# Patient Record
Sex: MALE | Race: WHITE | NOT HISPANIC OR LATINO | URBAN - METROPOLITAN AREA
[De-identification: names, ages, dates, MRNs, and addresses within clinical notes are randomized per-mention and may not be internally consistent; named-entity substitution may affect disease eponyms.]

---

## 2017-06-28 ENCOUNTER — APPOINTMENT (RX ONLY)
Dept: URBAN - METROPOLITAN AREA CLINIC 24 | Facility: CLINIC | Age: 77
Setting detail: DERMATOLOGY
End: 2017-06-28

## 2017-06-28 DIAGNOSIS — L82.1 OTHER SEBORRHEIC KERATOSIS: ICD-10-CM

## 2017-06-28 DIAGNOSIS — Z85.828 PERSONAL HISTORY OF OTHER MALIGNANT NEOPLASM OF SKIN: ICD-10-CM

## 2017-06-28 DIAGNOSIS — D485 NEOPLASM OF UNCERTAIN BEHAVIOR OF SKIN: ICD-10-CM

## 2017-06-28 PROBLEM — J30.1 ALLERGIC RHINITIS DUE TO POLLEN: Status: ACTIVE | Noted: 2017-06-28

## 2017-06-28 PROBLEM — D48.5 NEOPLASM OF UNCERTAIN BEHAVIOR OF SKIN: Status: ACTIVE | Noted: 2017-06-28

## 2017-06-28 PROCEDURE — ? SHAVE REMOVAL

## 2017-06-28 ASSESSMENT — LOCATION DETAILED DESCRIPTION DERM
LOCATION DETAILED: LEFT POSTERIOR SHOULDER
LOCATION DETAILED: LEFT INFERIOR LATERAL NECK
LOCATION DETAILED: RIGHT INFERIOR POSTERIOR HELIX
LOCATION DETAILED: RIGHT AREOLA

## 2017-06-28 ASSESSMENT — LOCATION ZONE DERM
LOCATION ZONE: EAR
LOCATION ZONE: NECK
LOCATION ZONE: TRUNK
LOCATION ZONE: ARM

## 2017-06-28 ASSESSMENT — LOCATION SIMPLE DESCRIPTION DERM
LOCATION SIMPLE: LEFT ANTERIOR NECK
LOCATION SIMPLE: RIGHT CHEST
LOCATION SIMPLE: RIGHT EAR
LOCATION SIMPLE: LEFT SHOULDER

## 2017-06-28 NOTE — PROCEDURE: SHAVE REMOVAL
Bill [id number] For Specimen Handling/Conveyance To Laboratory?: no
Biopsy Method: Dermablade
Billing Type: Third-Party Bill
Anesthesia Volume In Cc: 1
Path Notes (To The Dermatopathologist): Please check margins.
Notification Instructions: Patient will be notified of biopsy results. However, patient instructed to call the office if not contacted within 2 weeks.
Wound Care: Petrolatum
Consent was obtained from the patient. The risks and benefits to therapy were discussed in detail. Specifically, the risks of infection, scarring, bleeding, prolonged wound healing, incomplete removal, allergy to anesthesia, nerve injury and recurrence were addressed. Prior to the procedure, the treatment site was clearly identified and confirmed by the patient.
X Size Of Lesion In Cm (Optional): 0
Anesthesia Type: 1% lidocaine with epinephrine
Detail Level: Detailed
Hemostasis: Aluminum Chloride
Medical Necessity Information: It is in your best interest to select a reason for this procedure from the list below. All of these items fulfill various CMS LCD requirements except the new and changing color options.
Post-Care Instructions: I reviewed with the patient in detail post-care instructions. Patient is to keep the biopsy site dry overnight, and then apply bacitracin twice daily until healed. Patient may apply hydrogen peroxide soaks to remove any crusting.
Medical Necessity Clause: This procedure was medically necessary because the lesion that was treated was:

## 2017-08-09 ENCOUNTER — HOSPITAL ENCOUNTER (OUTPATIENT)
Dept: MRI IMAGING | Age: 77
Discharge: HOME | End: 2017-08-09
Attending: INTERNAL MEDICINE
Payer: MEDICARE

## 2017-08-09 DIAGNOSIS — R51.9 HEAD ACHE: ICD-10-CM

## 2017-08-09 PROCEDURE — 70551 MRI BRAIN STEM W/O DYE: CPT

## 2017-08-16 ENCOUNTER — HOSPITAL ENCOUNTER (OUTPATIENT)
Dept: LAB | Age: 77
Discharge: HOME | End: 2017-08-16
Attending: INTERNAL MEDICINE
Payer: MEDICARE

## 2017-08-16 LAB
ALBUMIN SERPL-MCNC: 4.1 G/DL (ref 3.2–4.6)
ALBUMIN/GLOB SERPL: 1.3 {RATIO} (ref 1.2–3.5)
ALP SERPL-CCNC: 124 U/L (ref 50–136)
ALT SERPL-CCNC: 22 U/L (ref 12–65)
ANION GAP SERPL CALC-SCNC: 8 MMOL/L (ref 7–16)
AST SERPL-CCNC: 19 U/L (ref 15–37)
BILIRUB DIRECT SERPL-MCNC: 0.1 MG/DL
BILIRUB SERPL-MCNC: 0.9 MG/DL (ref 0.2–1.1)
BUN SERPL-MCNC: 14 MG/DL (ref 8–23)
CALCIUM SERPL-MCNC: 9 MG/DL (ref 8.3–10.4)
CHLORIDE SERPL-SCNC: 104 MMOL/L (ref 98–107)
CHOLEST SERPL-MCNC: 145 MG/DL
CO2 SERPL-SCNC: 28 MMOL/L (ref 21–32)
CREAT SERPL-MCNC: 1.1 MG/DL (ref 0.8–1.5)
GLOBULIN SER CALC-MCNC: 3.2 G/DL (ref 2.3–3.5)
GLUCOSE SERPL-MCNC: 113 MG/DL (ref 65–100)
HDLC SERPL-MCNC: 41 MG/DL (ref 40–60)
HDLC SERPL: 3.5 {RATIO}
LDLC SERPL CALC-MCNC: 90.6 MG/DL
LIPID PROFILE,FLP: NORMAL
POTASSIUM SERPL-SCNC: 4 MMOL/L (ref 3.5–5.1)
PROT SERPL-MCNC: 7.3 G/DL (ref 6.3–8.2)
SODIUM SERPL-SCNC: 140 MMOL/L (ref 136–145)
TRIGL SERPL-MCNC: 67 MG/DL (ref 35–150)
VIT B12 SERPL-MCNC: 228 PG/ML (ref 193–986)
VLDLC SERPL CALC-MCNC: 13.4 MG/DL (ref 6–23)

## 2017-08-16 PROCEDURE — 80048 BASIC METABOLIC PNL TOTAL CA: CPT | Performed by: INTERNAL MEDICINE

## 2017-08-16 PROCEDURE — 80061 LIPID PANEL: CPT | Performed by: INTERNAL MEDICINE

## 2017-08-16 PROCEDURE — 36415 COLL VENOUS BLD VENIPUNCTURE: CPT | Performed by: INTERNAL MEDICINE

## 2017-08-16 PROCEDURE — 80076 HEPATIC FUNCTION PANEL: CPT | Performed by: INTERNAL MEDICINE

## 2017-08-16 PROCEDURE — 82607 VITAMIN B-12: CPT | Performed by: INTERNAL MEDICINE

## 2017-12-14 PROBLEM — N40.0 BENIGN PROSTATIC HYPERPLASIA WITHOUT LOWER URINARY TRACT SYMPTOMS: Status: ACTIVE | Noted: 2017-12-14

## 2018-01-05 PROBLEM — E53.8 B12 DEFICIENCY: Status: ACTIVE | Noted: 2018-01-05

## 2018-01-15 PROBLEM — G62.9 AXONAL POLYNEUROPATHY: Status: ACTIVE | Noted: 2018-01-15

## 2018-08-08 ENCOUNTER — HOSPITAL ENCOUNTER (OUTPATIENT)
Dept: CARDIAC REHAB | Age: 78
Discharge: HOME | End: 2018-08-08
Attending: INTERNAL MEDICINE
Payer: MEDICARE

## 2018-08-08 PROCEDURE — 99407 BEHAV CHNG SMOKING > 10 MIN: CPT

## 2018-08-15 ENCOUNTER — HOSPITAL ENCOUNTER (OUTPATIENT)
Dept: CARDIAC REHAB | Age: 78
Discharge: HOME | End: 2018-08-15
Attending: INTERNAL MEDICINE
Payer: MEDICARE

## 2018-08-15 PROCEDURE — 99407 BEHAV CHNG SMOKING > 10 MIN: CPT

## 2018-08-22 ENCOUNTER — HOSPITAL ENCOUNTER (OUTPATIENT)
Dept: CARDIAC REHAB | Age: 78
Discharge: HOME | End: 2018-08-22
Attending: INTERNAL MEDICINE
Payer: MEDICARE

## 2018-08-22 ENCOUNTER — APPOINTMENT (OUTPATIENT)
Dept: CARDIAC REHAB | Age: 78
End: 2018-08-22
Attending: INTERNAL MEDICINE
Payer: MEDICARE

## 2018-08-22 PROCEDURE — 99407 BEHAV CHNG SMOKING > 10 MIN: CPT

## 2018-08-29 ENCOUNTER — APPOINTMENT (OUTPATIENT)
Dept: CARDIAC REHAB | Age: 78
End: 2018-08-29
Attending: INTERNAL MEDICINE
Payer: MEDICARE

## 2018-08-29 ENCOUNTER — HOSPITAL ENCOUNTER (OUTPATIENT)
Dept: CARDIAC REHAB | Age: 78
Discharge: HOME | End: 2018-08-29
Attending: INTERNAL MEDICINE
Payer: MEDICARE

## 2018-08-29 PROCEDURE — 99407 BEHAV CHNG SMOKING > 10 MIN: CPT

## 2019-08-10 ENCOUNTER — HOSPITAL ENCOUNTER (EMERGENCY)
Age: 79
Discharge: HOME | End: 2019-08-10
Attending: EMERGENCY MEDICINE
Payer: MEDICARE

## 2019-08-10 VITALS
TEMPERATURE: 98.1 F | WEIGHT: 205 LBS | BODY MASS INDEX: 27.77 KG/M2 | HEIGHT: 72 IN | RESPIRATION RATE: 16 BRPM | HEART RATE: 97 BPM | SYSTOLIC BLOOD PRESSURE: 128 MMHG | OXYGEN SATURATION: 97 % | DIASTOLIC BLOOD PRESSURE: 84 MMHG

## 2019-08-10 DIAGNOSIS — R33.9 URINARY RETENTION: Primary | ICD-10-CM

## 2019-08-10 LAB
BACTERIA URNS QL MICRO: 0 /HPF
CASTS URNS QL MICRO: 0 /LPF
CRYSTALS URNS QL MICRO: 0 /LPF
EPI CELLS #/AREA URNS HPF: 0 /HPF
MUCOUS THREADS URNS QL MICRO: ABNORMAL /LPF
OTHER OBSERVATIONS,UCOM: ABNORMAL
RBC #/AREA URNS HPF: ABNORMAL /HPF
WBC URNS QL MICRO: 0 /HPF

## 2019-08-10 PROCEDURE — 77030005553 HC CATH URETH FOL52 BARD -A: Performed by: EMERGENCY MEDICINE

## 2019-08-10 PROCEDURE — 81015 MICROSCOPIC EXAM OF URINE: CPT

## 2019-08-10 PROCEDURE — 99283 EMERGENCY DEPT VISIT LOW MDM: CPT | Performed by: EMERGENCY MEDICINE

## 2019-08-10 PROCEDURE — 81003 URINALYSIS AUTO W/O SCOPE: CPT | Performed by: EMERGENCY MEDICINE

## 2019-08-10 PROCEDURE — 77030005518 HC CATH URETH FOL 2W BARD -B: Performed by: EMERGENCY MEDICINE

## 2019-08-10 PROCEDURE — 51702 INSERT TEMP BLADDER CATH: CPT | Performed by: EMERGENCY MEDICINE

## 2019-08-10 NOTE — DISCHARGE INSTRUCTIONS
Follow-up with Dr. Mary Lou Gonzalez, call his office to make an appointment. Return to the emergency department if your symptoms recur despite the Lloyd catheter.

## 2019-08-10 NOTE — ED NOTES
Placed chun with only 25mL output. Bladder scanner before catheter reveals max 136mL residual in bladder post urination.

## 2019-08-10 NOTE — ED NOTES
Pt states his urine is draining around his catheter.  MD notified and arrives to bedside for evaluation

## 2019-08-10 NOTE — ED NOTES
New catheter placed per MD verbal order. Pt tolerated well. Some leaking still noted with new catheter/ larger bore. MD notified. D/C orders to continue.

## 2019-08-10 NOTE — ED NOTES
I have reviewed discharge instructions with the patient and spouse. The patient and spouse verbalized understanding. Patient left ED via Discharge Method: ambulatory to Home with self. Opportunity for questions and clarification provided. Patient given 0 scripts. To continue your aftercare when you leave the hospital, you may receive an automated call from our care team to check in on how you are doing. This is a free service and part of our promise to provide the best care and service to meet your aftercare needs.  If you have questions, or wish to unsubscribe from this service please call [phone]. Thank you for Choosing our [facility] Emergency Department.

## 2019-08-10 NOTE — ED PROVIDER NOTES
Patient states he has been having urinary retention for the past week or so. He has been able to urinate but only a small amount. He feels like he has to go but is unable to. He denies similar symptoms in the past, states his symptoms are getting worse. He denies any fever, denies any vomiting or diarrhea. Elements of this note were made using speech recognition software. As such, errors of speech recognition may occur.            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    BPH (benign prostatic hypertrophy) 12/4/2013    Colon polyps     Elevated prostate specific antigen (PSA)     Hiatal hernia     HTN (hypertension) 12/4/2013    Impotence of organic origin     Ocular migraine     x 1    Sleep apnea     borderline    Vertigo 12/4/2013       Past Surgical History:   Procedure Laterality Date    HX CHOLECYSTECTOMY      hernia x2    HX HERNIA REPAIR      left inguinal    HX HIP REPLACEMENT  2007    HX LAP CHOLECYSTECTOMY  2007    HX ORTHOPAEDIC      hip     HX TONSILLECTOMY  childhood         Family History:   Problem Relation Age of Onset    Suicide Mother 61       Social History     Socioeconomic History    Marital status:      Spouse name: Not on file    Number of children: Not on file    Years of education: Not on file    Highest education level: Not on file   Occupational History    Not on file   Social Needs    Financial resource strain: Not on file    Food insecurity:     Worry: Not on file     Inability: Not on file    Transportation needs:     Medical: Not on file     Non-medical: Not on file   Tobacco Use    Smoking status: Never Smoker    Smokeless tobacco: Current User    Tobacco comment: 2 bites of 1 cigar in last week   Substance and Sexual Activity    Alcohol use: Yes     Comment: rare occassion    Drug use: No    Sexual activity: Not on file   Lifestyle    Physical activity:     Days per week: Not on file     Minutes per session: Not on file    Stress: Not on file   Relationships    Social connections:     Talks on phone: Not on file     Gets together: Not on file     Attends [employer]ism service: Not on file     Active member of club or organization: Not on file     Attends meetings of clubs or organizations: Not on file     Relationship status: Not on file    Intimate partner violence:     Fear of current or ex partner: Not on file     Emotionally abused: Not on file     Physically abused: Not on file     Forced sexual activity: Not on file   Other Topics Concern    Not on file   Social History Narrative    Not on file         ALLERGIES: Multihance [gadobenate dimeglumine]    Review of Systems   Constitutional: Negative for chills and fever. Gastrointestinal: Negative for nausea and vomiting. All other systems reviewed and are negative. Vitals:    08/10/19 1358   BP: 126/87   Pulse: (!) 101   Resp: 18   Temp: 97.9 °F (36.6 °C)   SpO2: 96%   Weight: 93 kg (205 lb)   Height: 6' (1.829 m)            Physical Exam   Constitutional: He is oriented to person, place, and time. He appears well-developed and well-nourished. HENT:   Head: Normocephalic and atraumatic. Eyes: Pupils are equal, round, and reactive to light. Conjunctivae are normal.   Neck: Normal range of motion. Neck supple. Pulmonary/Chest: Effort normal. No respiratory distress. Musculoskeletal: Normal range of motion. He exhibits no edema. Neurological: He is alert and oriented to person, place, and time. Skin: Skin is warm and dry. Nursing note and vitals reviewed. MDM  Number of Diagnoses or Management Options  Urinary retention: new and requires workup  Diagnosis management comments: 2:44 PM Lloyd will be placed  4:51 PM 16Fr Lloyd placed, patient is still having a moderate amount of leakage.   We will replace with an 18Fr Lloyd    Risk of Complications, Morbidity, and/or Mortality  Presenting problems: moderate  Diagnostic procedures: moderate  Management options: moderate    Patient Progress  Patient progress: improved         Procedures

## 2019-08-10 NOTE — ED NOTES
Pt states the chun catheter is uncomfortable when he sits down. Chun is draining and proper placement verified.

## 2019-08-10 NOTE — ED TRIAGE NOTES
Reports unable to empty his bladder. States has been dealing with retention problems for quite some time. Called urologist and was told he needs to come for a catheter.

## 2019-09-25 ENCOUNTER — HOSPITAL ENCOUNTER (OUTPATIENT)
Dept: SURGERY | Age: 79
Discharge: HOME | End: 2019-09-25
Payer: MEDICARE

## 2019-09-25 VITALS
SYSTOLIC BLOOD PRESSURE: 146 MMHG | HEIGHT: 72 IN | OXYGEN SATURATION: 96 % | BODY MASS INDEX: 27.7 KG/M2 | DIASTOLIC BLOOD PRESSURE: 84 MMHG | TEMPERATURE: 97.7 F | HEART RATE: 76 BPM | WEIGHT: 204.5 LBS | RESPIRATION RATE: 16 BRPM

## 2019-09-25 LAB
ANION GAP SERPL CALC-SCNC: 5 MMOL/L (ref 7–16)
APPEARANCE UR: ABNORMAL
BACTERIA URNS QL MICRO: ABNORMAL /HPF
BILIRUB UR QL: NEGATIVE
BUN SERPL-MCNC: 10 MG/DL (ref 8–23)
CALCIUM SERPL-MCNC: 9.1 MG/DL (ref 8.3–10.4)
CASTS URNS QL MICRO: ABNORMAL /LPF
CHLORIDE SERPL-SCNC: 110 MMOL/L (ref 98–107)
CO2 SERPL-SCNC: 27 MMOL/L (ref 21–32)
COLOR UR: YELLOW
CREAT SERPL-MCNC: 0.97 MG/DL (ref 0.8–1.5)
EPI CELLS #/AREA URNS HPF: 0 /HPF
ERYTHROCYTE [DISTWIDTH] IN BLOOD BY AUTOMATED COUNT: 12.2 % (ref 11.9–14.6)
GLUCOSE SERPL-MCNC: 125 MG/DL (ref 65–100)
GLUCOSE UR STRIP.AUTO-MCNC: NEGATIVE MG/DL
HCT VFR BLD AUTO: 43.4 % (ref 41.1–50.3)
HGB BLD-MCNC: 14.2 G/DL (ref 13.6–17.2)
HGB UR QL STRIP: NEGATIVE
KETONES UR QL STRIP.AUTO: NEGATIVE MG/DL
LEUKOCYTE ESTERASE UR QL STRIP.AUTO: ABNORMAL
MCH RBC QN AUTO: 31.4 PG (ref 26.1–32.9)
MCHC RBC AUTO-ENTMCNC: 32.7 G/DL (ref 31.4–35)
MCV RBC AUTO: 96 FL (ref 79.6–97.8)
NITRITE UR QL STRIP.AUTO: NEGATIVE
NRBC # BLD: 0 K/UL (ref 0–0.2)
PH UR STRIP: 6 [PH] (ref 5–9)
PLATELET # BLD AUTO: 221 K/UL (ref 150–450)
PMV BLD AUTO: 9.7 FL (ref 9.4–12.3)
POTASSIUM SERPL-SCNC: 4.2 MMOL/L (ref 3.5–5.1)
PROT UR STRIP-MCNC: NEGATIVE MG/DL
RBC # BLD AUTO: 4.52 M/UL (ref 4.23–5.6)
RBC #/AREA URNS HPF: ABNORMAL /HPF
SODIUM SERPL-SCNC: 142 MMOL/L (ref 136–145)
SP GR UR REFRACTOMETRY: 1.01 (ref 1–1.02)
UROBILINOGEN UR QL STRIP.AUTO: 0.2 EU/DL (ref 0.2–1)
WBC # BLD AUTO: 6.5 K/UL (ref 4.3–11.1)
WBC URNS QL MICRO: >100 /HPF

## 2019-09-25 PROCEDURE — 85027 COMPLETE CBC AUTOMATED: CPT

## 2019-09-25 PROCEDURE — 81001 URINALYSIS AUTO W/SCOPE: CPT

## 2019-09-25 PROCEDURE — 87186 SC STD MICRODIL/AGAR DIL: CPT

## 2019-09-25 PROCEDURE — 87086 URINE CULTURE/COLONY COUNT: CPT

## 2019-09-25 PROCEDURE — 87088 URINE BACTERIA CULTURE: CPT

## 2019-09-25 PROCEDURE — 80048 BASIC METABOLIC PNL TOTAL CA: CPT

## 2019-09-25 RX ORDER — SILODOSIN 8 MG/1
8 CAPSULE ORAL
COMMUNITY
End: 2019-10-05

## 2019-09-25 RX ORDER — NEOMYCIN SULFATE, POLYMYXIN B SULFATE AND HYDROCORTISONE 10; 3.5; 1 MG/ML; MG/ML; [USP'U]/ML
4 SUSPENSION/ DROPS AURICULAR (OTIC) 3 TIMES DAILY
Status: ON HOLD | COMMUNITY
End: 2019-10-02

## 2019-09-25 RX ORDER — SILDENAFIL CITRATE 20 MG/1
20 TABLET ORAL
COMMUNITY
End: 2019-10-16

## 2019-09-25 RX ORDER — MELATONIN 10 MG
CAPSULE ORAL AS NEEDED
COMMUNITY
End: 2020-03-12

## 2019-09-25 NOTE — PERIOP NOTES
Lab results reviewed and routed abnormal U/A to surgeon.  Cultures still pending    Recent Results (from the past 12 hour(s))   CBC W/O DIFF    Collection Time: 09/25/19 10:06 AM   Result Value Ref Range    WBC 6.5 4.3 - 11.1 K/uL    RBC 4.52 4.23 - 5.6 M/uL    HGB 14.2 13.6 - 17.2 g/dL    HCT 43.4 41.1 - 50.3 %    MCV 96.0 79.6 - 97.8 FL    MCH 31.4 26.1 - 32.9 PG    MCHC 32.7 31.4 - 35.0 g/dL    RDW 12.2 11.9 - 14.6 %    PLATELET 712 213 - 025 K/uL    MPV 9.7 9.4 - 12.3 FL    ABSOLUTE NRBC 0.00 0.0 - 0.2 K/uL   METABOLIC PANEL, BASIC    Collection Time: 09/25/19 10:06 AM   Result Value Ref Range    Sodium 142 136 - 145 mmol/L    Potassium 4.2 3.5 - 5.1 mmol/L    Chloride 110 (H) 98 - 107 mmol/L    CO2 27 21 - 32 mmol/L    Anion gap 5 (L) 7 - 16 mmol/L    Glucose 125 (H) 65 - 100 mg/dL    BUN 10 8 - 23 MG/DL    Creatinine 0.97 0.8 - 1.5 MG/DL    GFR est AA >60 >60 ml/min/1.73m2    GFR est non-AA >60 >60 ml/min/1.73m2    Calcium 9.1 8.3 - 10.4 MG/DL   URINALYSIS W/ RFLX MICROSCOPIC    Collection Time: 09/25/19 10:10 AM   Result Value Ref Range    Color YELLOW      Appearance CLOUDY      Specific gravity 1.014 1.001 - 1.023      pH (UA) 6.0 5.0 - 9.0      Protein NEGATIVE  NEG mg/dL    Glucose NEGATIVE  mg/dL    Ketone NEGATIVE  NEG mg/dL    Bilirubin NEGATIVE  NEG      Blood NEGATIVE  NEG      Urobilinogen 0.2 0.2 - 1.0 EU/dL    Nitrites NEGATIVE  NEG      Leukocyte Esterase LARGE (A) NEG      WBC >100 (H) 0 /hpf    RBC 0-3 0 /hpf    Epithelial cells 0 0 /hpf    Bacteria 4+ (H) 0 /hpf    Casts 5-10 0 /lpf

## 2019-09-25 NOTE — PERIOP NOTES
Patient verified name and . Patient provided medical/health information and PTA medications to the best of their ability. TYPE  CASE:lll  Order for consent  found in EHR and matches case posting. Labs per surgeon:CBC, BMP, U/A and Cultures. Labs per anesthesia protocol: T&S on DOS. EKG  :  None since , no cardiac hx per patient    Patient provided with and instructed on education handouts including Guide to Surgery, blood transfusions, pain management, and hand hygiene for the family and community, and [facility] brochure. Bathing / Showering with antibacterial soap and instructions given per hospital policy. Instructed patient to continue previous medications as prescribed prior to surgery unless otherwise directed and to take the following medications the day of surgery according to anesthesia guidelines : Finasteride, Silodosin . Instructed patient to hold  the following medications: Vitamins and ASA products. Original medication prescription bottles were visualized during patient appointment. Patient teach back successful and patient demonstrates knowledge of instruction.

## 2019-09-27 LAB
BACTERIA SPEC CULT: ABNORMAL
SERVICE CMNT-IMP: ABNORMAL

## 2019-10-02 ENCOUNTER — ANESTHESIA (OUTPATIENT)
Dept: SURGERY | Age: 79
DRG: 716 | End: 2019-10-02
Payer: MEDICARE

## 2019-10-02 ENCOUNTER — ANESTHESIA EVENT (OUTPATIENT)
Dept: SURGERY | Age: 79
DRG: 716 | End: 2019-10-02
Payer: MEDICARE

## 2019-10-02 ENCOUNTER — HOSPITAL ENCOUNTER (INPATIENT)
Age: 79
LOS: 3 days | Discharge: HOME | DRG: 716 | End: 2019-10-05
Attending: UROLOGY | Admitting: UROLOGY
Payer: MEDICARE

## 2019-10-02 DIAGNOSIS — N40.0 BENIGN PROSTATIC HYPERPLASIA, UNSPECIFIED WHETHER LOWER URINARY TRACT SYMPTOMS PRESENT: Primary | ICD-10-CM

## 2019-10-02 LAB
ABO + RH BLD: NORMAL
BLOOD GROUP ANTIBODIES SERPL: NORMAL
HCT VFR BLD AUTO: 37.4 % (ref 41.1–50.3)
HGB BLD-MCNC: 12 G/DL (ref 13.6–17.2)
SPECIMEN EXP DATE BLD: NORMAL

## 2019-10-02 PROCEDURE — 77030010545: Performed by: UROLOGY

## 2019-10-02 PROCEDURE — 77030019908 HC STETH ESOPH SIMS -A: Performed by: ANESTHESIOLOGY

## 2019-10-02 PROCEDURE — 76010000172 HC OR TIME 2.5 TO 3 HR INTENSV-TIER 1: Performed by: UROLOGY

## 2019-10-02 PROCEDURE — 76210000016 HC OR PH I REC 1 TO 1.5 HR: Performed by: UROLOGY

## 2019-10-02 PROCEDURE — 74011250636 HC RX REV CODE- 250/636

## 2019-10-02 PROCEDURE — 77030037088 HC TUBE ENDOTRACH ORAL NSL COVD-A: Performed by: ANESTHESIOLOGY

## 2019-10-02 PROCEDURE — 77030014008 HC SPNG HEMSTAT J&J -C: Performed by: UROLOGY

## 2019-10-02 PROCEDURE — 77030010512 HC APPL CLP LIG J&J -C: Performed by: UROLOGY

## 2019-10-02 PROCEDURE — P9045 ALBUMIN (HUMAN), 5%, 250 ML: HCPCS

## 2019-10-02 PROCEDURE — 77030020253 HC SOL INJ D545NS .05 DEX .45 SAL

## 2019-10-02 PROCEDURE — 77030002916 HC SUT ETHLN J&J -A: Performed by: UROLOGY

## 2019-10-02 PROCEDURE — 77030003602 HC NDL NRV BLK BBMI -B: Performed by: ANESTHESIOLOGY

## 2019-10-02 PROCEDURE — 77030031139 HC SUT VCRL2 J&J -A: Performed by: UROLOGY

## 2019-10-02 PROCEDURE — 77030010118 HC SHR COAG HARM J&J -E: Performed by: UROLOGY

## 2019-10-02 PROCEDURE — 74011000250 HC RX REV CODE- 250

## 2019-10-02 PROCEDURE — 77030040506 HC DRN WND MDII -A: Performed by: UROLOGY

## 2019-10-02 PROCEDURE — 77030034696 HC CATH URETH FOL 2W BARD -A: Performed by: UROLOGY

## 2019-10-02 PROCEDURE — 36415 COLL VENOUS BLD VENIPUNCTURE: CPT

## 2019-10-02 PROCEDURE — 77030008462 HC STPLR SKN PROX J&J -A: Performed by: UROLOGY

## 2019-10-02 PROCEDURE — 77030012406 HC DRN WND PENRS BARD -A: Performed by: UROLOGY

## 2019-10-02 PROCEDURE — 77030019927 HC TBNG IRR CYSTO BAXT -A: Performed by: UROLOGY

## 2019-10-02 PROCEDURE — 74011250636 HC RX REV CODE- 250/636: Performed by: UROLOGY

## 2019-10-02 PROCEDURE — 77030033269 HC SLV COMPR SCD KNE2 CARD -B: Performed by: UROLOGY

## 2019-10-02 PROCEDURE — 77030011264 HC ELECTRD BLD EXT COVD -A: Performed by: UROLOGY

## 2019-10-02 PROCEDURE — 0VB00ZZ EXCISION OF PROSTATE, OPEN APPROACH: ICD-10-PCS | Performed by: UROLOGY

## 2019-10-02 PROCEDURE — 77030027138 HC INCENT SPIROMETER -A

## 2019-10-02 PROCEDURE — 77030002888 HC SUT CHRMC J&J -A: Performed by: UROLOGY

## 2019-10-02 PROCEDURE — 74011000258 HC RX REV CODE- 258: Performed by: UROLOGY

## 2019-10-02 PROCEDURE — 77030008467 HC STPLR SKN COVD -B: Performed by: UROLOGY

## 2019-10-02 PROCEDURE — 77030005546 HC CATH URETH FOL 3W BARD -A: Performed by: UROLOGY

## 2019-10-02 PROCEDURE — 77030018836 HC SOL IRR NACL ICUM -A

## 2019-10-02 PROCEDURE — 76060000036 HC ANESTHESIA 2.5 TO 3 HR: Performed by: UROLOGY

## 2019-10-02 PROCEDURE — 74011250637 HC RX REV CODE- 250/637: Performed by: UROLOGY

## 2019-10-02 PROCEDURE — 74011250636 HC RX REV CODE- 250/636: Performed by: ANESTHESIOLOGY

## 2019-10-02 PROCEDURE — 86900 BLOOD TYPING SEROLOGIC ABO: CPT

## 2019-10-02 PROCEDURE — 77030032490 HC SLV COMPR SCD KNE COVD -B: Performed by: UROLOGY

## 2019-10-02 PROCEDURE — 88305 TISSUE EXAM BY PATHOLOGIST: CPT

## 2019-10-02 PROCEDURE — 65270000029 HC RM PRIVATE

## 2019-10-02 PROCEDURE — 77030019940 HC BLNKT HYPOTHRM STRY -B: Performed by: ANESTHESIOLOGY

## 2019-10-02 PROCEDURE — 77030002966 HC SUT PDS J&J -A: Performed by: UROLOGY

## 2019-10-02 PROCEDURE — 77030005206: Performed by: UROLOGY

## 2019-10-02 PROCEDURE — 77030040361 HC SLV COMPR DVT MDII -B: Performed by: UROLOGY

## 2019-10-02 PROCEDURE — 85018 HEMOGLOBIN: CPT

## 2019-10-02 PROCEDURE — 77030039425 HC BLD LARYNG TRULITE DISP TELE -A: Performed by: ANESTHESIOLOGY

## 2019-10-02 RX ORDER — SODIUM CHLORIDE 9 MG/ML
INJECTION, SOLUTION INTRAVENOUS
Status: DISCONTINUED | OUTPATIENT
Start: 2019-10-02 | End: 2019-10-02 | Stop reason: HOSPADM

## 2019-10-02 RX ORDER — ACETAMINOPHEN 325 MG/1
650 TABLET ORAL
Status: DISCONTINUED | OUTPATIENT
Start: 2019-10-02 | End: 2019-10-05 | Stop reason: HOSPADM

## 2019-10-02 RX ORDER — LIDOCAINE HYDROCHLORIDE 10 MG/ML
0.1 INJECTION INFILTRATION; PERINEURAL AS NEEDED
Status: DISCONTINUED | OUTPATIENT
Start: 2019-10-02 | End: 2019-10-02 | Stop reason: HOSPADM

## 2019-10-02 RX ORDER — HYDROCODONE BITARTRATE AND ACETAMINOPHEN 7.5; 325 MG/1; MG/1
1 TABLET ORAL
Status: DISCONTINUED | OUTPATIENT
Start: 2019-10-02 | End: 2019-10-05 | Stop reason: HOSPADM

## 2019-10-02 RX ORDER — DEXAMETHASONE SODIUM PHOSPHATE 4 MG/ML
INJECTION, SOLUTION INTRA-ARTICULAR; INTRALESIONAL; INTRAMUSCULAR; INTRAVENOUS; SOFT TISSUE
Status: DISCONTINUED | OUTPATIENT
Start: 2019-10-02 | End: 2019-10-02 | Stop reason: HOSPADM

## 2019-10-02 RX ORDER — OXYBUTYNIN CHLORIDE 5 MG/1
5 TABLET ORAL
Status: DISCONTINUED | OUTPATIENT
Start: 2019-10-02 | End: 2019-10-05 | Stop reason: HOSPADM

## 2019-10-02 RX ORDER — ZOLPIDEM TARTRATE 5 MG/1
5 TABLET ORAL
Status: DISCONTINUED | OUTPATIENT
Start: 2019-10-02 | End: 2019-10-05 | Stop reason: HOSPADM

## 2019-10-02 RX ORDER — DIPHENHYDRAMINE HYDROCHLORIDE 50 MG/ML
12.5 INJECTION, SOLUTION INTRAMUSCULAR; INTRAVENOUS
Status: DISCONTINUED | OUTPATIENT
Start: 2019-10-02 | End: 2019-10-05 | Stop reason: HOSPADM

## 2019-10-02 RX ORDER — ATROPA BELLADONNA AND OPIUM 16.2; 6 MG/1; MG/1
1 SUPPOSITORY RECTAL
Status: DISCONTINUED | OUTPATIENT
Start: 2019-10-02 | End: 2019-10-05 | Stop reason: HOSPADM

## 2019-10-02 RX ORDER — GLYCOPYRROLATE 0.2 MG/ML
INJECTION INTRAMUSCULAR; INTRAVENOUS AS NEEDED
Status: DISCONTINUED | OUTPATIENT
Start: 2019-10-02 | End: 2019-10-02 | Stop reason: HOSPADM

## 2019-10-02 RX ORDER — SODIUM CHLORIDE, SODIUM LACTATE, POTASSIUM CHLORIDE, CALCIUM CHLORIDE 600; 310; 30; 20 MG/100ML; MG/100ML; MG/100ML; MG/100ML
1000 INJECTION, SOLUTION INTRAVENOUS CONTINUOUS
Status: DISCONTINUED | OUTPATIENT
Start: 2019-10-02 | End: 2019-10-02 | Stop reason: HOSPADM

## 2019-10-02 RX ORDER — FINASTERIDE 5 MG/1
5 TABLET, FILM COATED ORAL DAILY
Status: DISCONTINUED | OUTPATIENT
Start: 2019-10-03 | End: 2019-10-05 | Stop reason: HOSPADM

## 2019-10-02 RX ORDER — ROCURONIUM BROMIDE 10 MG/ML
INJECTION, SOLUTION INTRAVENOUS AS NEEDED
Status: DISCONTINUED | OUTPATIENT
Start: 2019-10-02 | End: 2019-10-02 | Stop reason: HOSPADM

## 2019-10-02 RX ORDER — ALBUMIN HUMAN 50 G/1000ML
SOLUTION INTRAVENOUS AS NEEDED
Status: DISCONTINUED | OUTPATIENT
Start: 2019-10-02 | End: 2019-10-02 | Stop reason: HOSPADM

## 2019-10-02 RX ORDER — FENTANYL CITRATE 50 UG/ML
INJECTION, SOLUTION INTRAMUSCULAR; INTRAVENOUS AS NEEDED
Status: DISCONTINUED | OUTPATIENT
Start: 2019-10-02 | End: 2019-10-02 | Stop reason: HOSPADM

## 2019-10-02 RX ORDER — EPHEDRINE SULFATE 50 MG/ML
INJECTION, SOLUTION INTRAVENOUS AS NEEDED
Status: DISCONTINUED | OUTPATIENT
Start: 2019-10-02 | End: 2019-10-02 | Stop reason: HOSPADM

## 2019-10-02 RX ORDER — ALBUTEROL SULFATE 0.83 MG/ML
2.5 SOLUTION RESPIRATORY (INHALATION) AS NEEDED
Status: DISCONTINUED | OUTPATIENT
Start: 2019-10-02 | End: 2019-10-02 | Stop reason: HOSPADM

## 2019-10-02 RX ORDER — ONDANSETRON 2 MG/ML
4 INJECTION INTRAMUSCULAR; INTRAVENOUS
Status: DISCONTINUED | OUTPATIENT
Start: 2019-10-02 | End: 2019-10-02 | Stop reason: HOSPADM

## 2019-10-02 RX ORDER — ONDANSETRON 2 MG/ML
INJECTION INTRAMUSCULAR; INTRAVENOUS AS NEEDED
Status: DISCONTINUED | OUTPATIENT
Start: 2019-10-02 | End: 2019-10-02 | Stop reason: HOSPADM

## 2019-10-02 RX ORDER — ACETAMINOPHEN 10 MG/ML
INJECTION, SOLUTION INTRAVENOUS AS NEEDED
Status: DISCONTINUED | OUTPATIENT
Start: 2019-10-02 | End: 2019-10-02 | Stop reason: HOSPADM

## 2019-10-02 RX ORDER — SULFAMETHOXAZOLE AND TRIMETHOPRIM 800; 160 MG/1; MG/1
1 TABLET ORAL EVERY 12 HOURS
Status: COMPLETED | OUTPATIENT
Start: 2019-10-02 | End: 2019-10-05

## 2019-10-02 RX ORDER — MORPHINE SULFATE 2 MG/ML
2 INJECTION, SOLUTION INTRAMUSCULAR; INTRAVENOUS
Status: DISCONTINUED | OUTPATIENT
Start: 2019-10-02 | End: 2019-10-05 | Stop reason: HOSPADM

## 2019-10-02 RX ORDER — LIDOCAINE HYDROCHLORIDE 20 MG/ML
INJECTION, SOLUTION EPIDURAL; INFILTRATION; INTRACAUDAL; PERINEURAL AS NEEDED
Status: DISCONTINUED | OUTPATIENT
Start: 2019-10-02 | End: 2019-10-02 | Stop reason: HOSPADM

## 2019-10-02 RX ORDER — DOCUSATE SODIUM 100 MG/1
100 CAPSULE, LIQUID FILLED ORAL 2 TIMES DAILY
Status: DISCONTINUED | OUTPATIENT
Start: 2019-10-02 | End: 2019-10-05 | Stop reason: HOSPADM

## 2019-10-02 RX ORDER — PROPOFOL 10 MG/ML
INJECTION, EMULSION INTRAVENOUS AS NEEDED
Status: DISCONTINUED | OUTPATIENT
Start: 2019-10-02 | End: 2019-10-02 | Stop reason: HOSPADM

## 2019-10-02 RX ORDER — KETOROLAC TROMETHAMINE 30 MG/ML
INJECTION, SOLUTION INTRAMUSCULAR; INTRAVENOUS AS NEEDED
Status: DISCONTINUED | OUTPATIENT
Start: 2019-10-02 | End: 2019-10-02 | Stop reason: HOSPADM

## 2019-10-02 RX ORDER — ONDANSETRON 2 MG/ML
4 INJECTION INTRAMUSCULAR; INTRAVENOUS
Status: DISCONTINUED | OUTPATIENT
Start: 2019-10-02 | End: 2019-10-05 | Stop reason: HOSPADM

## 2019-10-02 RX ORDER — NEOSTIGMINE METHYLSULFATE 1 MG/ML
INJECTION INTRAVENOUS AS NEEDED
Status: DISCONTINUED | OUTPATIENT
Start: 2019-10-02 | End: 2019-10-02 | Stop reason: HOSPADM

## 2019-10-02 RX ORDER — ROPIVACAINE HYDROCHLORIDE 5 MG/ML
INJECTION, SOLUTION EPIDURAL; INFILTRATION; PERINEURAL
Status: DISCONTINUED | OUTPATIENT
Start: 2019-10-02 | End: 2019-10-02 | Stop reason: HOSPADM

## 2019-10-02 RX ORDER — CIPROFLOXACIN 2 MG/ML
400 INJECTION, SOLUTION INTRAVENOUS
Status: COMPLETED | OUTPATIENT
Start: 2019-10-02 | End: 2019-10-02

## 2019-10-02 RX ORDER — HYDROMORPHONE HYDROCHLORIDE 2 MG/ML
0.5 INJECTION, SOLUTION INTRAMUSCULAR; INTRAVENOUS; SUBCUTANEOUS
Status: DISCONTINUED | OUTPATIENT
Start: 2019-10-02 | End: 2019-10-02 | Stop reason: HOSPADM

## 2019-10-02 RX ORDER — DEXTROSE MONOHYDRATE AND SODIUM CHLORIDE 5; .45 G/100ML; G/100ML
100 INJECTION, SOLUTION INTRAVENOUS CONTINUOUS
Status: DISCONTINUED | OUTPATIENT
Start: 2019-10-02 | End: 2019-10-05 | Stop reason: HOSPADM

## 2019-10-02 RX ADMIN — ROCURONIUM BROMIDE 40 MG: 10 INJECTION, SOLUTION INTRAVENOUS at 13:55

## 2019-10-02 RX ADMIN — EPHEDRINE SULFATE 10 MG: 50 INJECTION, SOLUTION INTRAVENOUS at 14:18

## 2019-10-02 RX ADMIN — SODIUM CHLORIDE: 9 INJECTION, SOLUTION INTRAVENOUS at 15:32

## 2019-10-02 RX ADMIN — FENTANYL CITRATE 25 MCG: 50 INJECTION, SOLUTION INTRAMUSCULAR; INTRAVENOUS at 16:07

## 2019-10-02 RX ADMIN — DOCUSATE SODIUM 100 MG: 100 CAPSULE, LIQUID FILLED ORAL at 22:43

## 2019-10-02 RX ADMIN — ROPIVACAINE HYDROCHLORIDE 15 ML: 5 INJECTION, SOLUTION EPIDURAL; INFILTRATION; PERINEURAL at 14:02

## 2019-10-02 RX ADMIN — ONDANSETRON 4 MG: 2 INJECTION INTRAMUSCULAR; INTRAVENOUS at 14:04

## 2019-10-02 RX ADMIN — GLYCOPYRROLATE 0.8 MG: 0.2 INJECTION INTRAMUSCULAR; INTRAVENOUS at 16:07

## 2019-10-02 RX ADMIN — EPHEDRINE SULFATE 10 MG: 50 INJECTION, SOLUTION INTRAVENOUS at 15:14

## 2019-10-02 RX ADMIN — FENTANYL CITRATE 50 MCG: 50 INJECTION, SOLUTION INTRAMUSCULAR; INTRAVENOUS at 14:59

## 2019-10-02 RX ADMIN — SODIUM CHLORIDE, SODIUM LACTATE, POTASSIUM CHLORIDE, AND CALCIUM CHLORIDE 1000 ML: 600; 310; 30; 20 INJECTION, SOLUTION INTRAVENOUS at 12:40

## 2019-10-02 RX ADMIN — LIDOCAINE HYDROCHLORIDE 100 MG: 20 INJECTION, SOLUTION EPIDURAL; INFILTRATION; INTRACAUDAL; PERINEURAL at 13:54

## 2019-10-02 RX ADMIN — CIPROFLOXACIN 400 MG: 2 INJECTION, SOLUTION INTRAVENOUS at 13:58

## 2019-10-02 RX ADMIN — EPHEDRINE SULFATE 10 MG: 50 INJECTION, SOLUTION INTRAVENOUS at 15:24

## 2019-10-02 RX ADMIN — FENTANYL CITRATE 100 MCG: 50 INJECTION, SOLUTION INTRAMUSCULAR; INTRAVENOUS at 13:54

## 2019-10-02 RX ADMIN — ACETAMINOPHEN 1000 MG: 10 INJECTION, SOLUTION INTRAVENOUS at 16:12

## 2019-10-02 RX ADMIN — PROPOFOL 200 MG: 10 INJECTION, EMULSION INTRAVENOUS at 13:54

## 2019-10-02 RX ADMIN — ROPIVACAINE HYDROCHLORIDE 15 ML: 5 INJECTION, SOLUTION EPIDURAL; INFILTRATION; PERINEURAL at 15:05

## 2019-10-02 RX ADMIN — SODIUM CHLORIDE, SODIUM LACTATE, POTASSIUM CHLORIDE, AND CALCIUM CHLORIDE: 600; 310; 30; 20 INJECTION, SOLUTION INTRAVENOUS at 15:19

## 2019-10-02 RX ADMIN — SODIUM CHLORIDE 1000 MG: 900 INJECTION, SOLUTION INTRAVENOUS at 22:54

## 2019-10-02 RX ADMIN — ATROPA BELLADONNA AND OPIUM 1 SUPPOSITORY: 16.2; 6 SUPPOSITORY RECTAL at 17:41

## 2019-10-02 RX ADMIN — DEXAMETHASONE SODIUM PHOSPHATE 5 MG: 4 INJECTION, SOLUTION INTRA-ARTICULAR; INTRALESIONAL; INTRAMUSCULAR; INTRAVENOUS; SOFT TISSUE at 14:02

## 2019-10-02 RX ADMIN — EPHEDRINE SULFATE 10 MG: 50 INJECTION, SOLUTION INTRAVENOUS at 14:06

## 2019-10-02 RX ADMIN — EPHEDRINE SULFATE 10 MG: 50 INJECTION, SOLUTION INTRAVENOUS at 14:12

## 2019-10-02 RX ADMIN — SULFAMETHOXAZOLE AND TRIMETHOPRIM 1 TABLET: 800; 160 TABLET ORAL at 22:43

## 2019-10-02 RX ADMIN — KETOROLAC TROMETHAMINE 30 MG: 30 INJECTION, SOLUTION INTRAMUSCULAR; INTRAVENOUS at 16:04

## 2019-10-02 RX ADMIN — FENTANYL CITRATE 25 MCG: 50 INJECTION, SOLUTION INTRAMUSCULAR; INTRAVENOUS at 16:13

## 2019-10-02 RX ADMIN — ALBUMIN HUMAN 12.5 G: 50 SOLUTION INTRAVENOUS at 15:41

## 2019-10-02 RX ADMIN — ROCURONIUM BROMIDE 20 MG: 10 INJECTION, SOLUTION INTRAVENOUS at 15:00

## 2019-10-02 RX ADMIN — NEOSTIGMINE METHYLSULFATE 4 MG: 1 INJECTION INTRAVENOUS at 16:08

## 2019-10-02 NOTE — ANESTHESIA PREPROCEDURE EVALUATION
Relevant Problems   No relevant active problems       Anesthetic History   No history of anesthetic complications            Review of Systems / Medical History  Patient summary reviewed and pertinent labs reviewed    Pulmonary        Sleep apnea           Neuro/Psych   Within defined limits           Cardiovascular    Hypertension              Exercise tolerance: <4 METS     GI/Hepatic/Renal     GERD: well controlled           Endo/Other        Arthritis     Other Findings            Physical Exam    Airway  Mallampati: I  TM Distance: 4 - 6 cm  Neck ROM: normal range of motion   Mouth opening: Normal     Cardiovascular    Rhythm: regular  Rate: normal      Pertinent negatives: No murmur and peripheral edema   Dental  No notable dental hx       Pulmonary  Breath sounds clear to auscultation               Abdominal         Other Findings            Anesthetic Plan    ASA: 2  Anesthesia type: general      Post-op pain plan if not by surgeon: peripheral nerve block single    Induction: Intravenous  Anesthetic plan and risks discussed with: Patient      GETA with TAPs (consented pre-op)

## 2019-10-02 NOTE — BRIEF OP NOTE
BRIEF OPERATIVE NOTE    Date of Procedure: 10/2/2019   Preoperative Diagnosis: Benign prostatic hyperplasia, unspecified whether lower urinary tract symptoms present [N40.0]  Postoperative Diagnosis: Benign prostatic hyperplasia, unspecified whether lower urinary tract symptoms present [N40.0]    Procedure(s):  OPEN SIMPLE SUPRAPUBIC PROSTATECTOMY  Surgeon(s) and Role:     * Juan Antonio Villagran, DO - Primary     * Kenny Simpson MD - Assisting         Surgical Assistant: Sri Concepcion    Surgical Staff:  Circ-1: Efe Garcia RN  Circ-Relief: Niki Singer RN  Scrub Tech-1: Alexis Choi  Event Time In Time Out   Incision Start 1431    Incision Close 1613      Anesthesia: General   Estimated Blood Loss: 1200cc  Specimens:   ID Type Source Tests Collected by Time Destination   1 : prostate ademona Fresh Prostate  Ivory Alfaro DO 10/2/2019 1519 Pathology      Findings: see op note  Complications: none immediate  Implants: * No implants in log *

## 2019-10-02 NOTE — ANESTHESIA PROCEDURE NOTES
Peripheral Block    Start time: 10/2/2019 2:00 PM  End time: 10/2/2019 2:02 PM  Performed by: Shreyas Del Real MD  Authorized by: Shreyas Del Real MD       Pre-procedure:    Indications: at surgeon's request and post-op pain management    Preanesthetic Checklist: patient identified, risks and benefits discussed, site marked, timeout performed, anesthesia consent given and patient being monitored    Timeout Time: 14:00          Block Type:   Block Type:  TAP  Laterality:  Left  Monitoring:  Standard ASA monitoring, responsive to questions, continuous pulse ox, oxygen, frequent vital sign checks and heart rate  Injection Technique:  Single shot  Procedures: ultrasound guided    Patient Position: supine  Prep: chlorhexidine    Location:  Abdominal  Needle Type:  Stimuplex  Needle Gauge:  20 G  Needle Localization:  Ultrasound guidance    Assessment:  Number of attempts:  1  Injection Assessment:  Incremental injection every 5 mL, local visualized surrounding nerve on ultrasound, negative aspiration for blood, no intravascular symptoms, no paresthesia and ultrasound image on chart (no violation of intraperitoneal space.)  Patient tolerance:  Patient tolerated the procedure well with no immediate complications

## 2019-10-02 NOTE — ADDENDUM NOTE
Addendum  created 10/02/19 1634 by Cristhian Butterfield MD    Child order released for a procedure order, Intraprocedure Blocks edited, Order Canceled from Note, Sign clinical note

## 2019-10-02 NOTE — H&P
[facility]  [address]  [facility], [address]   [phone]     Huy Harry  : 1940           HPI   77 y.o., male presents in follow up for AUR. Prev followed for BPH and an elevated PSA. Prior to recent AUR he reported nocturia 1-2x per night on Flomax qhs but reports increased PVD and mild incont without sensory awareness requiring 2ppd. Cysto on 13 showed trilobar hypertrophy with mild trabeculations and cellulae. PSA was 6.4 on 6/24/15, 8.1 on 16 and 5.6 on 17. Neg bx on 12 for a PSA of 7.47. Vol was 140g. We stopped PSA testing at last visit due to age.  Denies gross hematuria. Recent PVR was 26cc by ultrasound.  Reports success with Viagra in past but rarely active.  Recently tried 60mg without success.  Never tried [employer].             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    BPH (benign prostatic hypertrophy) 2013    Colon polyps      Elevated prostate specific antigen (PSA)      Hiatal hernia      HTN (hypertension) 2013    Impotence of organic origin      Ocular migraine       x 1    Sleep apnea       borderline    Vertigo 2013            Past Surgical History:   Procedure Laterality Date    HX CHOLECYSTECTOMY         hernia x2    HX HERNIA REPAIR         left inguinal    HX HIP REPLACEMENT       HX LAP CHOLECYSTECTOMY       HX ORTHOPAEDIC         hip     HX TONSILLECTOMY   childhood             Current Outpatient Medications   Medication Sig Dispense Refill    oxybutynin (DITROPAN) 5 mg tablet Take 1 Tab by mouth two (2) times daily as needed (bladder spasm).  30 Tab 1    tamsulosin (FLOMAX) 0.4 mg capsule TAKE 1 CAPSULE NIGHTLY  90 Cap 4    multivitamin (ONE A DAY) tablet Take 1 Tab by mouth daily.               Allergies   Allergen Reactions    Multihance [Gadobenate Dimeglumine] Hives      Social History            Socioeconomic History    Marital status:        Spouse name: Not on file    Number of children: Not on file    Years of education: Not on file    Highest education level: Not on file   Occupational History    Not on file   Social Needs    Financial resource strain: Not on file    Food insecurity:       Worry: Not on file       Inability: Not on file    Transportation needs:       Medical: Not on file       Non-medical: Not on file   Tobacco Use    Smoking status: Never Smoker    Smokeless tobacco: Current User    Tobacco comment: 2 bites of 1 cigar in last week   Substance and Sexual Activity    Alcohol use:  Yes       Comment: rare occassion    Drug use: No    Sexual activity: Not on file   Lifestyle    Physical activity:       Days per week: Not on file       Minutes per session: Not on file    Stress: Not on file   Relationships    Social connections:       Talks on phone: Not on file       Gets together: Not on file       Attends Che[employer]ism service: Not on file       Active member of club or organization: Not on file       Attends meetings of clubs or organizations: Not on file       Relationship status: Not on file    Intimate partner violence:       Fear of current or ex partner: Not on file       Emotionally abused: Not on file       Physically abused: Not on file       Forced sexual activity: Not on file   Other Topics Concern    Not on file   Social History Narrative    Not on file            Family History   Problem Relation Age of Onset    Suicide Mother 61         Visit Vitals  Temp 98.2 °F (36.8 °C) (Oral)   Ht 6' (1.829 m)   Wt 210 lb (95.3 kg)   BMI 28.48 kg/m²          General appearance - alert, well appearing, and in no distress  Mental status - alert and oriented  Eyes - extraocular eye movements intact, sclera anicteric  Nose - normal and patent, no erythema or discharge  Mouth - mucous membranes moist  Chest - clear to auscultation bilaterally  Heart - normal rate, regular rhythm  Abdomen - soft, nontender, nondistended, no masses or organomegaly  Neurological - normal speech, no focal findings or movement disorder noted  Skin - normal coloration and turgor                   Cystoscopy Procedure (8/16):     All risks, benefits and alternatives were again reviewed with patient and he is willing to proceed at this time. His genital area was prepped and draped and a sterile field applied. 2% lidocaine jelly was injected in the the urethra and allowed to dwell for several minutes. A flexible cystoscope was then inserted into the urethral meatus and advanced under direct vision. The anterior and posterior urethra appeared normal in appearance. The prostatic urethra revealed sig trilobar hypertrophy. The bladder was systematically surveyed. No bladder trabeculations were seen. Mild PW edema noted from indwelling catheter. The ureteral orifices were seen in their normal orthotopic position. The cystoscope was then removed under direct vision. The patient tolerated the procedure well.     Assessment and Plan      ICD-10-CM ICD-9-CM     1. Benign prostatic hyperplasia, unspecified whether lower urinary tract symptoms present N40.0 600.00 CYSTOURETHROSCOPY   2. Urinary retention R33.9 788.20        He has elected to proceed with an open simple suprapubic prostatectomy.   All risks, benefits and alternatives to the above mentioned procedure were discussed and the patient is willing to proceed at this time.       Geovanny Anderson,

## 2019-10-02 NOTE — ANESTHESIA PROCEDURE NOTES
Peripheral Block    Start time: 10/2/2019 2:03 PM  End time: 10/2/2019 2:05 PM  Performed by: Mortimer Bugler, MD  Authorized by: Mortimer Bugler, MD       Pre-procedure:    Indications: at surgeon's request and post-op pain management    Preanesthetic Checklist: patient identified, risks and benefits discussed, site marked, timeout performed, anesthesia consent given and patient being monitored    Timeout Time: 14:05          Block Type:   Block Type:  TAP  Laterality:  Right  Monitoring:  Standard ASA monitoring, responsive to questions, continuous pulse ox, oxygen, frequent vital sign checks and heart rate  Injection Technique:  Single shot  Procedures: ultrasound guided    Patient Position: supine  Prep: chlorhexidine    Location:  Abdominal  Needle Type:  Stimuplex  Needle Gauge:  20 G  Needle Localization:  Ultrasound guidance    Assessment:  Number of attempts:  1  Injection Assessment:  Incremental injection every 5 mL, local visualized surrounding nerve on ultrasound, negative aspiration for blood, no intravascular symptoms, no paresthesia and ultrasound image on chart (no violation of intraperitoneal space.)  Patient tolerance:  Patient tolerated the procedure well with no immediate complications

## 2019-10-02 NOTE — PROGRESS NOTES
10/02/19 1845   Dual Skin Pressure Injury Assessment   Dual Skin Pressure Injury Assessment WDL   Second Care Provider (Based on [address] [place] [address]) Jose F Lynn, RN     Pt arrived to floor via stretcher. A&Ox4. No skin breakdown noted. Incision to mid abdomen covered by gauze. Suprapubic in place cbi going. Blanchable redness to buttocks. Wife at bedside.

## 2019-10-02 NOTE — PERIOP NOTES
5:31 PM  Report from Austin Oliveros RN. Lab called for H/H draw. 1750-  TRANSFER - OUT REPORT:    Verbal report given to KEATON Bailey on Joaquin Chowdary  being transferred to [phone] for routine post - op       Report consisted of patients Situation, Background, Assessment and   Recommendations(SBAR). Information from the following report(s) SBAR, OR Summary, Procedure Summary, Intake/Output, MAR and Cardiac Rhythm NSR was reviewed with the receiving nurse. Lines:   Peripheral IV 10/02/19 Left Wrist (Active)   Site Assessment Clean, dry, & intact 10/2/2019 12:40 PM   Phlebitis Assessment 0 10/2/2019 12:40 PM   Infiltration Assessment 0 10/2/2019 12:40 PM   Dressing Status Clean, dry, & intact 10/2/2019 12:40 PM   Dressing Type Tape;Transparent 10/2/2019 12:40 PM   Hub Color/Line Status Infusing;Patent 10/2/2019 12:40 PM       Peripheral IV 10/02/19 Right Forearm (Active)        Opportunity for questions and clarification was provided. Patient transported with:   O2 @ 3 liters    VTE prophylaxis orders have been written for Joaquin Chowdary. Family in waiting room given room number. 5:52 PM  [place] at bedside, notified of very bloody CBI. MD put catheter on traction with new stat lock.

## 2019-10-02 NOTE — ANESTHESIA POSTPROCEDURE EVALUATION
Procedure(s):  OPEN SIMPLE PROSTATECTOMY. general    Anesthesia Post Evaluation      Multimodal analgesia: multimodal analgesia used between 6 hours prior to anesthesia start to PACU discharge  Patient location during evaluation: bedside  Patient participation: complete - patient participated  Level of consciousness: awake and responsive to light touch  Pain management: adequate  Airway patency: patent  Anesthetic complications: no  Cardiovascular status: acceptable, hemodynamically stable, blood pressure returned to baseline and stable  Respiratory status: acceptable, unassisted, spontaneous ventilation and nonlabored ventilation  Hydration status: acceptable  Post anesthesia nausea and vomiting:  controlled      Vitals Value Taken Time   /66 10/2/2019  4:24 PM   Temp 36.8 °C (98.3 °F) 10/2/2019  4:22 PM   Pulse 85 10/2/2019  4:28 PM   Resp 16 10/2/2019  4:22 PM   SpO2 97 % 10/2/2019  4:28 PM   Vitals shown include unvalidated device data.

## 2019-10-03 PROBLEM — N40.0 BPH (BENIGN PROSTATIC HYPERPLASIA): Status: ACTIVE | Noted: 2019-10-03

## 2019-10-03 LAB
ANION GAP SERPL CALC-SCNC: 9 MMOL/L (ref 7–16)
BUN SERPL-MCNC: 20 MG/DL (ref 8–23)
CALCIUM SERPL-MCNC: 8.5 MG/DL (ref 8.3–10.4)
CHLORIDE SERPL-SCNC: 106 MMOL/L (ref 98–107)
CO2 SERPL-SCNC: 23 MMOL/L (ref 21–32)
CREAT SERPL-MCNC: 1.46 MG/DL (ref 0.8–1.5)
GLUCOSE SERPL-MCNC: 171 MG/DL (ref 65–100)
HCT VFR BLD AUTO: 33.3 % (ref 41.1–50.3)
HGB BLD-MCNC: 10.8 G/DL (ref 13.6–17.2)
POTASSIUM SERPL-SCNC: 4.7 MMOL/L (ref 3.5–5.1)
SODIUM SERPL-SCNC: 138 MMOL/L (ref 136–145)

## 2019-10-03 PROCEDURE — 74011000258 HC RX REV CODE- 258: Performed by: UROLOGY

## 2019-10-03 PROCEDURE — 97530 THERAPEUTIC ACTIVITIES: CPT

## 2019-10-03 PROCEDURE — 74011250636 HC RX REV CODE- 250/636: Performed by: UROLOGY

## 2019-10-03 PROCEDURE — 97161 PT EVAL LOW COMPLEX 20 MIN: CPT

## 2019-10-03 PROCEDURE — 36415 COLL VENOUS BLD VENIPUNCTURE: CPT

## 2019-10-03 PROCEDURE — 80048 BASIC METABOLIC PNL TOTAL CA: CPT

## 2019-10-03 PROCEDURE — 65270000029 HC RM PRIVATE

## 2019-10-03 PROCEDURE — 85018 HEMOGLOBIN: CPT

## 2019-10-03 PROCEDURE — 77030020253 HC SOL INJ D545NS .05 DEX .45 SAL

## 2019-10-03 PROCEDURE — 77030020257 HC SOL INJ SOD CL 0.45% 1000ML BG

## 2019-10-03 PROCEDURE — 51700 IRRIGATION OF BLADDER: CPT

## 2019-10-03 PROCEDURE — 77030018836 HC SOL IRR NACL ICUM -A

## 2019-10-03 PROCEDURE — 74011250637 HC RX REV CODE- 250/637: Performed by: UROLOGY

## 2019-10-03 RX ADMIN — ONDANSETRON 4 MG: 2 INJECTION INTRAMUSCULAR; INTRAVENOUS at 09:13

## 2019-10-03 RX ADMIN — SODIUM CHLORIDE 1000 MG: 900 INJECTION, SOLUTION INTRAVENOUS at 09:57

## 2019-10-03 RX ADMIN — SULFAMETHOXAZOLE AND TRIMETHOPRIM 1 TABLET: 800; 160 TABLET ORAL at 20:27

## 2019-10-03 RX ADMIN — DOCUSATE SODIUM 100 MG: 100 CAPSULE, LIQUID FILLED ORAL at 09:16

## 2019-10-03 RX ADMIN — SULFAMETHOXAZOLE AND TRIMETHOPRIM 1 TABLET: 800; 160 TABLET ORAL at 09:16

## 2019-10-03 RX ADMIN — DEXTROSE MONOHYDRATE AND SODIUM CHLORIDE 100 ML/HR: 5; .45 INJECTION, SOLUTION INTRAVENOUS at 20:23

## 2019-10-03 RX ADMIN — HYDROCODONE BITARTRATE AND ACETAMINOPHEN 1 TABLET: 7.5; 325 TABLET ORAL at 20:27

## 2019-10-03 RX ADMIN — DOCUSATE SODIUM 100 MG: 100 CAPSULE, LIQUID FILLED ORAL at 17:56

## 2019-10-03 RX ADMIN — FINASTERIDE 5 MG: 5 TABLET, FILM COATED ORAL at 09:16

## 2019-10-03 NOTE — PROGRESS NOTES
Admit Date: 10/2/2019    Subjective:     Yogesh Meade is doing well. He is tolerating clear liquids. CBI infusing through SP catheter at moderate drip, chun draining clear pink urine. Did require manual irrigation last night for clot retention. No current SP pain/pressure, c/o occasional bladder spasms. SCDs in place. Objective:     Patient Vitals for the past 8 hrs:   BP Temp Pulse Resp SpO2   10/03/19 0906 118/75 98.4 °F (36.9 °C) 95 20 100 %   10/03/19 0537 104/64 98.1 °F (36.7 °C) 100  100 %     No intake/output data recorded. 10/01 1901 - 10/03 0700  In: 28034 [I.V.:2432]  Out: 40701 [ALXZK:11655; Drains:210]    Physical Exam:  GENERAL: alert, cooperative, no distress  LUNG: clear to auscultation bilaterally  HEART: regular rate and rhythm, S1, S2  ABDOMEN: soft, non-tender, dressing c/d/i, SP catheter in place with CBI infusing in.    NEUROLOGIC: AOx3      Data Review   Recent Results (from the past 24 hour(s))   TYPE & SCREEN    Collection Time: 10/02/19 12:42 PM   Result Value Ref Range    Crossmatch Expiration 10/05/2019     ABO/Rh(D) Jo Avendano POSITIVE     Antibody screen NEG    HGB & HCT    Collection Time: 10/02/19  7:29 PM   Result Value Ref Range    HGB 12.0 (L) 13.6 - 17.2 g/dL    HCT 37.4 (L) 41.1 - 15.8 %   METABOLIC PANEL, BASIC    Collection Time: 10/03/19  6:31 AM   Result Value Ref Range    Sodium 138 136 - 145 mmol/L    Potassium 4.7 3.5 - 5.1 mmol/L    Chloride 106 98 - 107 mmol/L    CO2 23 21 - 32 mmol/L    Anion gap 9 7 - 16 mmol/L    Glucose 171 (H) 65 - 100 mg/dL    BUN 20 8 - 23 MG/DL    Creatinine 1.46 0.8 - 1.5 MG/DL    GFR est AA 60 (L) >60 ml/min/1.73m2    GFR est non-AA 50 (L) >60 ml/min/1.73m2    Calcium 8.5 8.3 - 10.4 MG/DL   HGB & HCT    Collection Time: 10/03/19  6:31 AM   Result Value Ref Range    HGB 10.8 (L) 13.6 - 17.2 g/dL    HCT 33.3 (L) 41.1 - 50.3 %       Assessment:     Active Problems:    BPH (benign prostatic hyperplasia) (10/3/2019)      POD 1:    POSTOPERATIVE DIAGNOSIS:  Acute urinary retention.     PROCEDURE PERFORMED:  Open simple suprapubic prostatectomy. Cn 1.46  Hgb 10.8  Afebrile, VSS    Plan:     Advance to full liquids. Continue SCDs. Ambulate pt consistently. PT consulted. Continue CBI via SP catheter and 3-way catheter, titrate drip to keep clear. [address], NP  [facility]    I have reviewed the above note and examined the patient. I agree with the exam, assessment and plan. Cont to report bladder pressure. AF.  VSS. Labs reviewed. Abd soft, ND.  UOP pink on mod CBI. S/P open simple prostatectomy. Advance diet slowly. Wean CBI. Ambulate.   Recheck labs in am.    Nancy Soler,

## 2019-10-03 NOTE — OP NOTES
[address] [place] [address]  OPERATIVE REPORT    Name:  Eugenie Gómez  MR#:  [id number]  :  1940  ACCOUNT #:  [de-identified]  DATE OF SERVICE:  10/02/2019      Please note that today I assisted Dr. Josue Weir in performing a suprapubic prostatectomy through a lower midline incision. I was there for the entire case.       Manuel Jaramillo MD      [id number]  D:  10/02/2019 17:56  T:  10/03/2019 4:55  JOB #:  [id number]

## 2019-10-03 NOTE — PROGRESS NOTES
END OF SHIFT NOTE:    INTAKE/OUTPUT  10/02 0701 - 10/03 0700  In: 12711 [I.V.:2432]  Out: 94173 [PAWKX:70484; Drains:210]  Voiding: NO  Catheter: YES  Color: clear red  Drain:   Nnamdi-Bunch Drain 10/02/19 Left Abdomen (Active)   Site Assessment Clean, dry, & intact 10/3/2019  4:45 AM   Dressing Status Clean, dry, & intact 10/3/2019  4:45 AM   Status Patent; Charged;Draining 10/3/2019  4:45 AM   Drainage Color Serosanguinous 10/3/2019  4:45 AM   Output (ml) 40 ml 10/3/2019  4:45 AM               DIET  clear    Flatus: Patient does not have flatus present. Stool:  0 occurrences. Characteristics:  Stool Assessment  Stool Appearance: Watery    Ambulating  No    Emesis: 0 occurrences.     Characteristics:          VITAL SIGNS  Patient Vitals for the past 12 hrs:   Temp Pulse BP SpO2   10/03/19 0537 98.1 °F (36.7 °C) 100 104/64 100 %   10/03/19 0141 97.8 °F (36.6 °C) 100 102/65 98 %   10/02/19 2152 97.6 °F (36.4 °C) (!) 111 91/58 96 %       Pain Assessment  Pain Intensity 1: 0 (10/02/19 1733)        Patient Stated Pain Goal: 3            Pattie Barnett RN

## 2019-10-03 NOTE — PROGRESS NOTES
Initial visit by  to convey care and concern and encourage patient that  services are available if desired. No needs were voiced during the visit. Provided 's business card for future reference. Chaplains remain available for support.      Cain Newell 67  Board Certified

## 2019-10-03 NOTE — PROGRESS NOTES
Problem: Mobility Impaired (Adult and Pediatric)  Goal: *Acute Goals and Plan of Care (Insert Text)  Description  LTG:  (1.)Mr. Ron Martínez will move from supine to sit and sit to supine, scoot up and down and roll side to side INDEPENDENTLY with bed flat within 7 treatment day(s). (2.)Mr. Ron Martínez will transfer from bed to chair and chair to bed INDEPENDENTLY within 7 treatment day(s). (3.)Mr. Ron Martínez will ambulate INDEPENDENTLY for 500+ feet within 7 treatment day(s). (4.)Mr. Ron Martínez will ascend and descend 10 steps with SUPERVISION using handrail(s) within 7 days. ________________________________________________________________________________________________   Outcome: Progressing Towards Goal     PHYSICAL THERAPY: Initial Assessment and AM 10/3/2019  INPATIENT: PT Visit Days : 1  Payor: Vicente Shankar / Plan: [address] [place] [address] HMO / Product Type: [employer] /       NAME/AGE/GENDER: Bonnie Henning is a 77 y.o. male   PRIMARY DIAGNOSIS: Benign prostatic hyperplasia, unspecified whether lower urinary tract symptoms present [N40.0]  BPH (benign prostatic hyperplasia) [N40.0] <principal problem not specified>   <principal problem not specified>    Procedure(s) (LRB):  OPEN SIMPLE PROSTATECTOMY (N/A)  1 Day Post-Op  ICD-10: Treatment Diagnosis:    Generalized Muscle Weakness (M62.81)  Difficulty in walking, Not elsewhere classified (R26.2)  Other abnormalities of gait and mobility (R26.89)   Precaution/Allergies:  Multihance [gadobenate dimeglumine]      ASSESSMENT:     Mr. Ron Martínez is a pleasant 77year old male seen for initial therapy evaluation following suprapubic prostatectomy. At baseline he lives with wife in two story home and is fully independent with mobility, ambulation, and ADLs without use of any DME. Presents in supine without major complaints, mild post op soreness. Reviewed log roll and bed mobility techniques to decrease pain and improve independence with transfer to sitting.  Pt performs with SBA-supervision. Intact seated balance noted. Seated /66 with mild dizziness initially. Pt closing right or left eyes intermittently due to reports of double vision which is not new. Stood from edge of bed with CGA. 93/56 initial standing BP with c/o increased dizziness. Returned to sitting for a few minutes. On second stand, BP 76/48. Pt eager to sit in chair, able to take steps with CGA then BP immediately 96/56. Positioned comfortably in chair with wife present, needs in reach. RN notified of BP. Tahir Lomas is moving well post op however mobility limited today by hypotension in standing. Likely to progress well with therapy; probably home at AL with [facility] PT or outpatient or none pending progress. This section established at most recent assessment   PROBLEM LIST (Impairments causing functional limitations):  Decreased Strength  Decreased ADL/Functional Activities  Decreased Transfer Abilities  Decreased Ambulation Ability/Technique  Decreased Balance  Increased Pain  Decreased Activity Tolerance  Decreased Cognition   INTERVENTIONS PLANNED: (Benefits and precautions of physical therapy have been discussed with the patient.)  Balance Exercise  Bed Mobility  Gait Training  Home Exercise Program (HEP)  Therapeutic Activites  Therapeutic Exercise/Strengthening  Transfer Training     TREATMENT PLAN: Frequency/Duration: 3 times a week for duration of hospital stay  Rehabilitation Potential For Stated Goals: Good     REHAB RECOMMENDATIONS (at time of discharge pending progress):    Placement: It is my opinion, based on this patient's performance to date, that Mr. Alejandrina Childress may benefit from [address] after discharge due to the functional deficits listed above that are likely to improve with skilled rehabilitation because he/she has multiple medical issues that affect his/her functional mobility in the community.  Possible outpatient PT vs none  Equipment:   Tbd, likely none HISTORY:   History of Present Injury/Illness (Reason for Referral):  Per H&P, \"68 y.o., male presents in follow up for AUR. Prev followed for BPH and an elevated PSA. Prior to recent AUR he reported nocturia 1-2x per night on Flomax qhs but reports increased PVD and mild incont without sensory awareness requiring 2ppd. Cysto on 11/16/13 showed trilobar hypertrophy with mild trabeculations and cellulae. PSA was 6.4 on 6/24/15, 8.1 on 4/25/16 and 5.6 on 5/30/17. Neg bx on 12/11/12 for a PSA of 7.47. Vol was 140g. We stopped PSA testing at last visit due to age. Denies gross hematuria. Recent PVR was 26cc by ultrasound. Reports success with Viagra in past but rarely active. Recently tried 60mg without success. Never tried Myrbetriq\"    Past Medical History/Comorbidities:   Mr. 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 Mr. Roxie Hernandez  has a past surgical history that includes hx hip replacement (2007); hx tonsillectomy (childhood); hx cholecystectomy; hx lap cholecystectomy (2007); hx hernia repair; and hx orthopaedic (Right). Social History/Living Environment:   Home Environment: Private residence  # Steps to Enter: 3  One/Two Story Residence: Two story  # of Interior Steps: 15  Interior Rails: Both  Lift Chair Available: No  Living Alone: No  Support Systems: Spouse/Significant Other/Partner, Child(marcela), Family member(s)  Patient Expects to be Discharged to[de-identified] Private residence  Current DME Used/Available at Home: Catha Fritter, straight, Walker, rolling, Commode, bedside  Prior Level of Function/Work/Activity:  Lives with wife in two story home. Independent baseline. No DME use. Hx neuropathy.       Number of Personal Factors/Comorbidities that affect the Plan of Care: 1-2: MODERATE COMPLEXITY   EXAMINATION:   Most Recent Physical Functioning:   Gross Assessment:  AROM: Within functional limits  Strength: Generally decreased, functional  Coordination: Within functional limits  Sensation: Impaired(peripheral neuropathy)               Posture:  Posture (WDL): Exceptions to WDL  Posture Assessment: Forward head, Rounded shoulders  Balance:  Sitting: Intact  Standing: Impaired  Standing - Static: Fair(+)  Standing - Dynamic : Fair(+) Bed Mobility:  Rolling: Supervision  Supine to Sit: Supervision  Scooting: Supervision  Wheelchair Mobility:     Transfers:  Sit to Stand: Contact guard assistance  Stand to Sit: Contact guard assistance  Bed to Chair: Contact guard assistance  Interventions: Verbal cues; Safety awareness training  Duration: 9 Minutes  Gait:     Base of Support: Center of gravity altered  Step Length: Left shortened;Right shortened  Gait Abnormalities: Trunk sway increased; Path deviations  Distance (ft): 5 Feet (ft)  Assistive Device: (none)  Ambulation - Level of Assistance: Contact guard assistance  Interventions: Verbal cues; Safety awareness training      Body Structures Involved:  Muscles Body Functions Affected: Movement Related Activities and Participation Affected:  General Tasks and Demands  Mobility  Domestic Life  Community, Social and [address] [place] [address]   Number of elements that affect the Plan of Care: 4+: HIGH COMPLEXITY   CLINICAL PRESENTATION:   Presentation: Stable and uncomplicated: LOW COMPLEXITY   CLINICAL DECISION MAKIN [facility] Mobility Inpatient Short Form  How much difficulty does the patient currently have. .. Unable A Lot A Little None   1. Turning over in bed (including adjusting bedclothes, sheets and blankets)? ? 1   ? 2   ? 3   ? 4   2. Sitting down on and standing up from a chair with arms ( e.g., wheelchair, bedside commode, etc.)   ?  1   ? 2 ? 3   ? 4   3. Moving from lying on back to sitting on the side of the bed?   ? 1   ? 2   ? 3   ? 4   How much help from another person does the patient currently need. .. Total A Lot A Little None   4. Moving to and from a bed to a chair (including a wheelchair)? ? 1   ? 2   ? 3   ? 4   5. Need to walk in hospital room? ? 1   ? 2   ? 3   ? 4   6. Climbing 3-5 steps with a railing? ? 1   ? 2   ? 3   ? 4   © 2007, Trustees of [address] [place] [address] [address] [address], under license to [employer]. All rights reserved      Score:  Initial: 19 Most Recent: X (Date: -- )    Interpretation of Tool:  Represents activities that are increasingly more difficult (i.e. Bed mobility, Transfers, Gait). Medical Necessity:     Patient demonstrates fair   rehab potential due to higher previous functional level. Reason for Services/Other Comments:  Patient continues to demonstrate capacity to improve strength, balance, mobility, transfers, activity tolerance which will increase independence, decrease amount of assistance required from caregiver and increase safety  . Use of outcome tool(s) and clinical judgement create a POC that gives a: Clear prediction of patient's progress: LOW COMPLEXITY            TREATMENT:   (In addition to Assessment/Re-Assessment sessions the following treatments were rendered)   Pre-treatment Symptoms/Complaints:  \"I can sit for awhile\"  Pain: Initial:   Pain Intensity 1: 0  Post Session:  0/10     Therapeutic Activity: (  9 Minutes ):  Therapeutic activities including Bed transfers, Chair transfers, Ambulation on level ground and standing static/dynamic balance  to improve mobility, strength, balance and activity tolerance . Required minimal Verbal cues; Safety awareness training to promote static and dynamic balance in standing and promote motor control of bilateral, lower extremity(s).      Braces/Orthotics/Lines/Etc:   IV  chun catheter  CBI   O2 Device: Room air  Treatment/Session Assessment:    Response to Treatment:  limited by hypotension in standing (symptomatic) otherwise moves well, CGA for transfers  Interdisciplinary Collaboration:   Physical Therapist  Registered Nurse  After treatment position/precautions:   Up in chair  Bed/Chair-wheels locked  Bed in low position  Call light within reach  RN notified  Family at bedside   Compliance with Program/Exercises: Will assess as treatment progresses  Recommendations/Intent for next treatment session: \"Next visit will focus on advancements to more challenging activities and reduction in assistance provided\".   Total Treatment Duration:  PT Patient Time In/Time Out  Time In: 1105  Time Out: [address]3838 S Florian Robles DPT

## 2019-10-03 NOTE — PROGRESS NOTES
Chart screened by [facility]  for discharge planning. Patient admitted under inpatient status for surgical procedure (open simple suprapubic prostatectomy). PT has been consulted to assist with post-operative ambulation needs. [facility] will continue to monitor this case for possible DC needs, and will remain available to patient / medical team. Please call with any questions: [phone] ext [phone]. Care Management Interventions  Mode of Transport at Discharge:  Other (see comment)  Transition of Care Consult (CM Consult): Discharge Planning  Discharge Durable Medical Equipment: No  Physical Therapy Consult: Yes  Occupational Therapy Consult: No  Speech Therapy Consult: No  Plan discussed with Pt/Family/Caregiver: No(Chart screened.)  Discharge Location  Discharge Placement: Unable to determine at this time

## 2019-10-03 NOTE — OP NOTES
[address] [place] [address]  OPERATIVE REPORT    Name:  Zee Alas  MR#:  [id number]  :  1940  ACCOUNT #:  [de-identified]  DATE OF SERVICE:  10/02/2019    PREOPERATIVE DIAGNOSIS:  Acute urinary retention. POSTOPERATIVE DIAGNOSIS:  Acute urinary retention. PROCEDURE PERFORMED:  Open simple suprapubic prostatectomy. SURGEON:  Kristie Roque DO    ASSISTANT:  Bri Ma. ANESTHESIA:  GETA. COMPLICATIONS:  None immediate. SPECIMENS REMOVED:  Prostate adenoma. IMPLANTS:  None. ESTIMATED BLOOD LOSS:  1200 mL. CLINICAL HISTORY:  This is a 74-year-old gentleman who was recently discovered to be in urinary retention. Recent cystoscopy revealed trilobar hypertrophy of the prostate. All risks, benefits and alternatives to the above-mentioned procedure have been reviewed and he is willing to proceed at this time. DESCRIPTION OF OPERATIVE PROCEDURE:  Patient consent was obtained. The patient was brought back to the operating room; at which time, he was placed in the supine position. After the uneventful induction of general anesthesia, his genital and abdominal areas were prepped and draped and a sterile field applied. A lower midline incision was made and this was carried down to the space of Retzius without difficulty. A 16-Martiniquais Lloyd catheter was placed into the bladder and the bladder filled approximately 300 mL of sterile water. Two separate 2-0 Vicryl stay sutures were then placed in the anterior bladder wall. A vertical cystotomy was then made. The [place] retractor was then assembled and a Kuldip blade was used to retract the bladder superiorly. The bladder neck was then scored circumferentially around the adenoma. Essentially, the patient appeared to have an enlarged median lobe as well as a left lateral lobe. Dissection then carried distally towards the urethra using a combination of blunt and sharp dissection. Hemostasis was obtained using spot electrocautery. The apical adenoma was then incised using scissors and the adenoma removed. A digital sweep was then performed to ensure that all adenomatous tissue had been removed and none was palpated. There was some moderate bleeding from the prostatic arteries at the 5 and 7 o'clock positions. We elected to reapproximate the bladder mucosa to the prostatic urethral mucosa in a forward advancement fashion using 2-0 chromic in a simple interrupted fashion. Care was taken to avoid injury to the ureteral orifices bilaterally. Following complete hemostasis, a 22-Belarusian Lloyd catheter was placed into the urethra into the bladder. We placed a separate 20-Bel[place]ian Lloyd catheter through the bladder and out the anterior abdominal wall which will be used as a suprapubic tube. The suprapubic tube was then sewn in place using 2-0 chromic suture using a tennis racket stitch on the anterior abdominal wall. The suprapubic tube was also sewn in placed at the skin using 2-0 nylon suture. 10 mL of sterile water was instilled into the suprapubic catheter balloon and 20 mL of sterile water was instilled into the urethral catheter balloon. A bladder was then closed in two layers. The first layer incorporated the mucosa and muscularis layers. This was closed using 2-0 Vicryl in a running fashion. The second layer was closed in similar fashion. This incorporated the serosal layers. The bladder was then filled to 300 mL of sterile water. No leak was seen. A 15 round LEIGH ANN drain was placed into the lower pelvis and exited out the left lower quadrant. This was sewn in place using 2-0 nylon sutures as well. The midline fascia was then closed using a #1 PDS in running fashion. Skin incision was then closed using skin staples. The patient tolerated the procedure well. Estimated blood loss was 1200 mL.       DO RISSA [id number]  D:  10/02/2019 16:30  T:  10/03/2019 3:59  JOB #:  [id number]

## 2019-10-04 LAB
ANION GAP SERPL CALC-SCNC: 7 MMOL/L (ref 7–16)
BUN SERPL-MCNC: 16 MG/DL (ref 8–23)
CALCIUM SERPL-MCNC: 8.4 MG/DL (ref 8.3–10.4)
CHLORIDE SERPL-SCNC: 106 MMOL/L (ref 98–107)
CO2 SERPL-SCNC: 25 MMOL/L (ref 21–32)
CREAT SERPL-MCNC: 1.17 MG/DL (ref 0.8–1.5)
ERYTHROCYTE [DISTWIDTH] IN BLOOD BY AUTOMATED COUNT: 12.6 % (ref 11.9–14.6)
GLUCOSE SERPL-MCNC: 120 MG/DL (ref 65–100)
HCT VFR BLD AUTO: 27.7 % (ref 41.1–50.3)
HGB BLD-MCNC: 8.8 G/DL (ref 13.6–17.2)
MCH RBC QN AUTO: 31.2 PG (ref 26.1–32.9)
MCHC RBC AUTO-ENTMCNC: 31.8 G/DL (ref 31.4–35)
MCV RBC AUTO: 98.2 FL (ref 79.6–97.8)
NRBC # BLD: 0 K/UL (ref 0–0.2)
PLATELET # BLD AUTO: 162 K/UL (ref 150–450)
PMV BLD AUTO: 10.1 FL (ref 9.4–12.3)
POTASSIUM SERPL-SCNC: 4.3 MMOL/L (ref 3.5–5.1)
RBC # BLD AUTO: 2.82 M/UL (ref 4.23–5.6)
SODIUM SERPL-SCNC: 138 MMOL/L (ref 136–145)
WBC # BLD AUTO: 14.3 K/UL (ref 4.3–11.1)

## 2019-10-04 PROCEDURE — 77030020253 HC SOL INJ D545NS .05 DEX .45 SAL

## 2019-10-04 PROCEDURE — 97530 THERAPEUTIC ACTIVITIES: CPT

## 2019-10-04 PROCEDURE — 77030018836 HC SOL IRR NACL ICUM -A

## 2019-10-04 PROCEDURE — 74011250637 HC RX REV CODE- 250/637: Performed by: UROLOGY

## 2019-10-04 PROCEDURE — 80048 BASIC METABOLIC PNL TOTAL CA: CPT

## 2019-10-04 PROCEDURE — 85027 COMPLETE CBC AUTOMATED: CPT

## 2019-10-04 PROCEDURE — 65270000029 HC RM PRIVATE

## 2019-10-04 PROCEDURE — 74011000258 HC RX REV CODE- 258: Performed by: UROLOGY

## 2019-10-04 PROCEDURE — 36415 COLL VENOUS BLD VENIPUNCTURE: CPT

## 2019-10-04 RX ADMIN — SULFAMETHOXAZOLE AND TRIMETHOPRIM 1 TABLET: 800; 160 TABLET ORAL at 21:14

## 2019-10-04 RX ADMIN — HYDROCODONE BITARTRATE AND ACETAMINOPHEN 1 TABLET: 7.5; 325 TABLET ORAL at 01:27

## 2019-10-04 RX ADMIN — SULFAMETHOXAZOLE AND TRIMETHOPRIM 1 TABLET: 800; 160 TABLET ORAL at 08:10

## 2019-10-04 RX ADMIN — DEXTROSE MONOHYDRATE AND SODIUM CHLORIDE 100 ML/HR: 5; .45 INJECTION, SOLUTION INTRAVENOUS at 05:20

## 2019-10-04 RX ADMIN — FINASTERIDE 5 MG: 5 TABLET, FILM COATED ORAL at 08:11

## 2019-10-04 RX ADMIN — DOCUSATE SODIUM 100 MG: 100 CAPSULE, LIQUID FILLED ORAL at 08:10

## 2019-10-04 RX ADMIN — HYDROCODONE BITARTRATE AND ACETAMINOPHEN 1 TABLET: 7.5; 325 TABLET ORAL at 21:15

## 2019-10-04 RX ADMIN — DOCUSATE SODIUM 100 MG: 100 CAPSULE, LIQUID FILLED ORAL at 17:11

## 2019-10-04 NOTE — PROGRESS NOTES
Hourly rounds completed this shift. All needs met. Bed low/locked. No c/o pain. Passing flatus. Ambulating with wife. Call light in reach. Will continue to monitor pt and give report to oncoming RN. Peripheral IV 10/02/19 Left Wrist (Active)   Site Assessment Clean, dry, & intact 10/4/2019  2:22 PM   Phlebitis Assessment 0 10/4/2019  2:22 PM   Infiltration Assessment 0 10/4/2019  2:22 PM   Dressing Status Clean, dry, & intact 10/4/2019  2:22 PM   Dressing Type Transparent;Tape 10/4/2019  2:22 PM   Hub Color/Line Status Green; Infusing;Patent 10/4/2019  2:22 PM   Alcohol Cap Used No 10/2/2019  5:33 PM

## 2019-10-04 NOTE — PROGRESS NOTES
Admit Date: 10/2/2019    Subjective:     Chelsey Ferguson is doing well. He got up to chair yesterday for several hours. CBI infusing via SP catheter, urethral chun draining clear currently. He has passed flatus, tolerating full liquids. Objective:     Patient Vitals for the past 8 hrs:   BP Temp Pulse Resp SpO2   10/04/19 0738 122/68 98.2 °F (36.8 °C) 69 18 98 %   10/04/19 0504 103/65 98.4 °F (36.9 °C) 78 16 96 %     No intake/output data recorded.   10/02 1901 - 10/04 0700  In: 34527.7 [I.V.:1293.7]  Out: 29579 [Urine:95447; Drains:90]    Physical Exam:  GENERAL: alert, cooperative, no distress  LUNG: clear to auscultation bilaterally  HEART: regular rate and rhythm, S1, S2  ABDOMEN: soft, non-tender, staples c/d/i, SP catheter in place, slight serosanguinous leakage around cath, LEIGH ANN drain in place, active BS, +flatus  NEUROLOGIC: AOx3        Data Review   Recent Results (from the past 24 hour(s))   METABOLIC PANEL, BASIC    Collection Time: 10/04/19  5:39 AM   Result Value Ref Range    Sodium 138 136 - 145 mmol/L    Potassium 4.3 3.5 - 5.1 mmol/L    Chloride 106 98 - 107 mmol/L    CO2 25 21 - 32 mmol/L    Anion gap 7 7 - 16 mmol/L    Glucose 120 (H) 65 - 100 mg/dL    BUN 16 8 - 23 MG/DL    Creatinine 1.17 0.8 - 1.5 MG/DL    GFR est AA >60 >60 ml/min/1.73m2    GFR est non-AA >60 >60 ml/min/1.73m2    Calcium 8.4 8.3 - 10.4 MG/DL   CBC W/O DIFF    Collection Time: 10/04/19  5:39 AM   Result Value Ref Range    WBC 14.3 (H) 4.3 - 11.1 K/uL    RBC 2.82 (L) 4.23 - 5.6 M/uL    HGB 8.8 (L) 13.6 - 17.2 g/dL    HCT 27.7 (L) 41.1 - 50.3 %    MCV 98.2 (H) 79.6 - 97.8 FL    MCH 31.2 26.1 - 32.9 PG    MCHC 31.8 31.4 - 35.0 g/dL    RDW 12.6 11.9 - 14.6 %    PLATELET 370 604 - 825 K/uL    MPV 10.1 9.4 - 12.3 FL    ABSOLUTE NRBC 0.00 0.0 - 0.2 K/uL       Assessment:     Active Problems:    BPH (benign prostatic hyperplasia) (10/3/2019)      POD 2:     POSTOPERATIVE DIAGNOSIS:  Acute urinary retention.     PROCEDURE PERFORMED:  Open simple suprapubic prostatectomy.     Cn 1.17  Hgb 8.8  Afebrile, VSS    Plan:     Advance to regular diet. Ambulate pt. Remove LEIGH ANN drain. Wean CBI drip via SP catheter. Leave urethral chun to drainage. Monitor color. If urine remains clear, we will plug SP catheter and continue urethral chun. H&H tomorrow. Pt will RTO next Tuesday or Weds with me for urethral chun catheter removal and SP catheter plugging. He will then RTO around October 15 with Dr. Kavin Olsen for staple/SP cath removal.      Carlito Ramos, NP  [facility]    I have reviewed the above note and examined the patient. I agree with the exam, assessment and plan. Doing well. AF.  VSS. Hgb 8.8. Cr improved. UOP pink on mild CBI. Incision healing well. S/P simple prostatectomy POD#2. Home with Chun to drainage and SPT capped when able to titrate off CBI. Follow up with NP next wk for Chun removal and follow up with me in 2 wks for staple and SPT removal if voiding well.     Girma Villagran, DO

## 2019-10-04 NOTE — PROGRESS NOTES
END OF SHIFT NOTE:    INTAKE/OUTPUT  10/03 0701 - 10/04 0700  In: 98390.3 [I.V.:868.3]  Out: 62144 [Urine:15769; Drains:15]  Voiding: NO  Catheter: YES  Color: clear orange  Drain: yes  Nnamdi-Bunch Drain 10/02/19 Left Abdomen (Active)   Site Assessment Clean, dry, & intact 10/4/2019  7:35 AM   Dressing Status Clean, dry, & intact 10/4/2019  7:35 AM   Status Patent; Charged;Draining 10/4/2019  7:35 AM   Drainage Color Sanguinous 10/4/2019  7:35 AM   Output (ml) 5 ml 10/4/2019  5:04 AM       Supra-pubic Catheter 10/02/19 (Active)   Indications for Use Surgery 10/4/2019  7:35 AM   Irrigation Volume Input (mL) 3000 ml 10/3/2019 12:12 PM   Urine Output (mL) 3800 ml 10/3/2019 12:12 PM               DIET  full liquid    Flatus: Patient does not have flatus present. Stool:  0 occurrences. Characteristics:  Stool Assessment  Stool Appearance: Watery    Ambulating  Yes to bedside chair    Emesis: 0 occurrences.     Characteristics:          VITAL SIGNS  Patient Vitals for the past 12 hrs:   Temp Pulse Resp BP SpO2   10/04/19 0738 98.2 °F (36.8 °C) 69 18 122/68 98 %   10/04/19 0504 98.4 °F (36.9 °C) 78 16 103/65 96 %   10/04/19 0032 98.7 °F (37.1 °C) 76 16 113/58 95 %       Pain Assessment  Pain Intensity 1: 0 (10/04/19 0234)  Pain Location 1: Abdomen, Penis     Patient Stated Pain Goal: 0            Flaco Patel RN

## 2019-10-04 NOTE — PROGRESS NOTES
Problem: Mobility Impaired (Adult and Pediatric)  Goal: *Acute Goals and Plan of Care (Insert Text)  Description  LTG:  (1.)Mr. Varinder Ivy will move from supine to sit and sit to supine, scoot up and down and roll side to side INDEPENDENTLY with bed flat within 7 treatment day(s). (2.)Mr. Varinder Ivy will transfer from bed to chair and chair to bed INDEPENDENTLY within 7 treatment day(s). (3.)Mr. Varinder Ivy will ambulate INDEPENDENTLY for 500+ feet within 7 treatment day(s). (4.)Mr. Varinder Ivy will ascend and descend 10 steps with SUPERVISION using handrail(s) within 7 days. ________________________________________________________________________________________________   Outcome: Progressing Towards Goal     PHYSICAL THERAPY: Daily Note and PM 10/4/2019  INPATIENT: PT Visit Days : 2  Payor: Bailee Davis / Plan: [address] [place] [address] HMO / Product Type: [employer] /       NAME/AGE/GENDER: Tere Celis is a 77 y.o. male   PRIMARY DIAGNOSIS: Benign prostatic hyperplasia, unspecified whether lower urinary tract symptoms present [N40.0]  BPH (benign prostatic hyperplasia) [N40.0] <principal problem not specified>   <principal problem not specified>    Procedure(s) (LRB):  OPEN SIMPLE PROSTATECTOMY (N/A)  2 Days Post-Op  ICD-10: Treatment Diagnosis:    · Generalized Muscle Weakness (M62.81)  · Difficulty in walking, Not elsewhere classified (R26.2)  · Other abnormalities of gait and mobility (R26.89)   Precaution/Allergies:  Multihance [gadobenate dimeglumine]      ASSESSMENT:     Mr. Varinder Ivy is a pleasant 77year old male seen for initial therapy evaluation following suprapubic prostatectomy. At baseline he lives with wife in two story home and is fully independent with mobility, ambulation, and ADLs without use of any DME. 10/4- pt presents sitting up in bed with wife present. Agreeable to therapy and eager to walk today. Pt performs bed mobility with supervision with head of bed elevated.  We did verbally review log roll technique from yesterday. Worked on seated activities, sit-stand transfers, and standing balance during functional activities. Standing /54 with pt asymptomatic. Provided with walker and ambulates 450 ft in hallway with SBA-supervision. Demonstrates forward flexed posture and looks down at floor, likely due to baseline neuropathy. Fluctuating gait pace with increased lateral trunk sway and mild balance deficits. Pt returned to room and up to recliner after activity for short rest break. Performs below LE exercises per review with good participation and eagerness to continue exercising on his own daily. Positioned comfortably with needs in reach, wife present. Cassie Avilez is progressing well with therapy and was able to greatly improve gait distance today due to better BP. Plans for home at [place], may benefit from [place] [facility]. This section established at most recent assessment   PROBLEM LIST (Impairments causing functional limitations):  1. Decreased Strength  2. Decreased ADL/Functional Activities  3. Decreased Transfer Abilities  4. Decreased Ambulation Ability/Technique  5. Decreased Balance  6. Increased Pain  7. Decreased Activity Tolerance  8. Decreased Cognition   INTERVENTIONS PLANNED: (Benefits and precautions of physical therapy have been discussed with the patient.)  1. Balance Exercise  2. Bed Mobility  3. Gait Training  4. Home Exercise Program (HEP)  5. Therapeutic Activites  6. Therapeutic Exercise/Strengthening  7. Transfer Training     TREATMENT PLAN: Frequency/Duration: 3 times a week for duration of hospital stay  Rehabilitation Potential For Stated Goals: Good     REHAB RECOMMENDATIONS (at time of discharge pending progress):    Placement: It is my opinion, based on this patient's performance to date, that Mr. Mellissa Nicole may benefit from [address] after discharge due to the functional deficits listed above that are likely to improve with skilled rehabilitation because he/she has multiple medical issues that affect his/her functional mobility in the community. Possible outpatient PT vs none  Equipment:    Tbd, likely none               HISTORY:   History of Present Injury/Illness (Reason for Referral):  Per H&P, \"68 y.o., male presents in follow up for AUR. Prev followed for BPH and an elevated PSA. Prior to recent AUR he reported nocturia 1-2x per night on Flomax qhs but reports increased PVD and mild incont without sensory awareness requiring 2ppd. Cysto on 11/16/13 showed trilobar hypertrophy with mild trabeculations and cellulae. PSA was 6.4 on 6/24/15, 8.1 on 4/25/16 and 5.6 on 5/30/17. Neg bx on 12/11/12 for a PSA of 7.47. Vol was 140g. We stopped PSA testing at last visit due to age. Denies gross hematuria. Recent PVR was 26cc by ultrasound. Reports success with Viagra in past but rarely active. Recently tried 60mg without success. Never tried Myrbetriq\"    Past Medical History/Comorbidities:   Mr. 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 Mr. Osbaldo Del Real  has a past surgical history that includes hx hip replacement (2007); hx tonsillectomy (childhood); hx cholecystectomy; hx lap cholecystectomy (2007); hx hernia repair; and hx orthopaedic (Right).   Social History/Living Environment:   Home Environment: Private residence  # Steps to Enter: 3  One/Two Story Residence: Two story  # of Interior Steps: 14  Interior Rails: Both  Lift Chair Available: No  Living Alone: No  Support Systems: Spouse/Significant Other/Partner, Child(marcela), Family member(s)  Patient Expects to be Discharged to[de-identified] Private residence  Current DME Used/Available at Home: nathaniel Gonzalez Starlyn Leech, rolling, Commode, bedside  Prior Level of Function/Work/Activity:  Lives with wife in two story home. Independent baseline. No DME use. Hx neuropathy. Number of Personal Factors/Comorbidities that affect the Plan of Care: 1-2: MODERATE COMPLEXITY   EXAMINATION:   Most Recent Physical Functioning:   Gross Assessment:  AROM: Within functional limits  Strength: Generally decreased, functional  Coordination: Within functional limits  Sensation: Impaired(peripheral neuropathy)               Posture:  Posture (WDL): Exceptions to WDL  Posture Assessment: Forward head, Rounded shoulders  Balance:  Sitting: Intact  Standing: Impaired  Standing - Static: Fair(+)  Standing - Dynamic : Fair(+) Bed Mobility:  Supine to Sit: Supervision  Scooting: Supervision  Wheelchair Mobility:     Transfers:  Sit to Stand: Stand-by assistance  Stand to Sit: Stand-by assistance  Bed to Chair: Stand-by assistance  Interventions: Verbal cues; Safety awareness training; Tactile cues  Duration: 24 Minutes  Gait:     Base of Support: Center of gravity altered  Step Length: Left shortened;Right shortened  Gait Abnormalities: Trunk sway increased  Distance (ft): 450 Feet (ft)  Assistive Device: Walker, rolling  Ambulation - Level of Assistance: Stand-by assistance;Supervision  Interventions: Verbal cues; Safety awareness training      Body Structures Involved:  1. Muscles Body Functions Affected:  1. Movement Related Activities and Participation Affected:  1. General Tasks and Demands  2. Mobility  3. Domestic Life  4. Community, Social and [place]   Number of elements that affect the Plan of Care: 4+: HIGH COMPLEXITY   CLINICAL PRESENTATION:   Presentation: Stable and uncomplicated: LOW COMPLEXITY   CLINICAL DECISION MAKIN [facility] Mobility Inpatient Short Form  How much difficulty does the patient currently have. .. Unable A Lot A Little None   1.   Turning over in bed (including adjusting bedclothes, sheets and blankets)? ? 1   ? 2   ? 3   ? 4   2. Sitting down on and standing up from a chair with arms ( e.g., wheelchair, bedside commode, etc.)   ? 1   ? 2   ? 3   ? 4   3. Moving from lying on back to sitting on the side of the bed?   ? 1   ? 2   ? 3   ? 4   How much help from another person does the patient currently need. .. Total A Lot A Little None   4. Moving to and from a bed to a chair (including a wheelchair)? ? 1   ? 2   ? 3   ? 4   5. Need to walk in hospital room? ? 1   ? 2   ? 3   ? 4   6. Climbing 3-5 steps with a railing? ? 1   ? 2   ? 3   ? 4   © 2007, Trustees of [address] [place] [address] [address] [id number], under license to [employer]. All rights reserved      Score:  Initial: 19 Most Recent: X (Date: -- )    Interpretation of Tool:  Represents activities that are increasingly more difficult (i.e. Bed mobility, Transfers, Gait). Medical Necessity:     · Patient demonstrates fair  ·  rehab potential due to higher previous functional level. Reason for Services/Other Comments:  · Patient continues to demonstrate capacity to improve strength, balance, mobility, transfers, activity tolerance which will increase independence, decrease amount of assistance required from caregiver and increase safety  · . Use of outcome tool(s) and clinical judgement create a POC that gives a: Clear prediction of patient's progress: LOW COMPLEXITY            TREATMENT:   (In addition to Assessment/Re-Assessment sessions the following treatments were rendered)   Pre-treatment Symptoms/Complaints:  \"thank you\"  Pain: Initial:   Pain Intensity 1: 0  Post Session:  0/10     Therapeutic Activity: (  24 Minutes ):  Therapeutic activities including Bed mobility, seated activities, sit-stand transfers, standing functional activities in room, Chair transfers, Ambulation on level ground, and seated exercise review to improve mobility, strength, balance and activity tolerance . Required minimal Verbal cues; Safety awareness training to promote static and dynamic balance in standing and promote motor control of bilateral, lower extremity(s). Date:  10/4/19 Date:   Date:     Activity/Exercise Parameters Parameters Parameters   LAQ 5x AB     Seated marching 5x AB     Ankle pumps 5x AB     Seated hip aBd 5x AB                             Braces/Orthotics/Lines/Etc:   · IV  · chun catheter  · CBI   · O2 Device: Room air  Treatment/Session Assessment:    · Response to Treatment: pt performs mobility with CGA/SBA  · Interdisciplinary Collaboration:   o Physical Therapist  o Registered Nurse  · After treatment position/precautions:   o Up in chair  o Bed/Chair-wheels locked  o Bed in low position  o Call light within reach  o RN notified  o Family at bedside   · Compliance with Program/Exercises: Will assess as treatment progresses  · Recommendations/Intent for next treatment session: \"Next visit will focus on advancements to more challenging activities and reduction in assistance provided\".   Total Treatment Duration:  PT Patient Time In/Time Out  Time In: 1305  Time Out: [address] [facility]

## 2019-10-05 VITALS
RESPIRATION RATE: 19 BRPM | SYSTOLIC BLOOD PRESSURE: 134 MMHG | HEART RATE: 81 BPM | HEIGHT: 72 IN | BODY MASS INDEX: 26.58 KG/M2 | WEIGHT: 196.25 LBS | DIASTOLIC BLOOD PRESSURE: 63 MMHG | TEMPERATURE: 99 F | OXYGEN SATURATION: 95 %

## 2019-10-05 LAB
HCT VFR BLD AUTO: 25.6 % (ref 41.1–50.3)
HGB BLD-MCNC: 8.2 G/DL (ref 13.6–17.2)

## 2019-10-05 PROCEDURE — 36415 COLL VENOUS BLD VENIPUNCTURE: CPT

## 2019-10-05 PROCEDURE — 85018 HEMOGLOBIN: CPT

## 2019-10-05 PROCEDURE — 74011250637 HC RX REV CODE- 250/637: Performed by: UROLOGY

## 2019-10-05 PROCEDURE — 77030020253 HC SOL INJ D545NS .05 DEX .45 SAL

## 2019-10-05 RX ORDER — HYDROCODONE BITARTRATE AND ACETAMINOPHEN 5; 325 MG/1; MG/1
1 TABLET ORAL
Qty: 20 TAB | Refills: 0 | Status: SHIPPED | OUTPATIENT
Start: 2019-10-05 | End: 2019-10-12

## 2019-10-05 RX ADMIN — DOCUSATE SODIUM 100 MG: 100 CAPSULE, LIQUID FILLED ORAL at 08:33

## 2019-10-05 RX ADMIN — SULFAMETHOXAZOLE AND TRIMETHOPRIM 1 TABLET: 800; 160 TABLET ORAL at 08:33

## 2019-10-05 RX ADMIN — FINASTERIDE 5 MG: 5 TABLET, FILM COATED ORAL at 08:33

## 2019-10-05 NOTE — PROGRESS NOTES
Pt running on continous bladder irrigation at slow drip rate, draining clear-pink urine with red flakes. Currently draining with no issues. Pt complaint of pain controlled with PRN Norco per MAR. Hourly rounds completed on patient. All needs are met. Bed locked in and in low position. Call light within reach. Will report to oncoming nurse at bedside.

## 2019-10-05 NOTE — PROGRESS NOTES
Suprapubic chun plugged. Extensive teaching provided. Wife and pt provided \"teachback\" method of chun care and leg bag.

## 2019-10-05 NOTE — DISCHARGE INSTRUCTIONS
Patient Education        Benign Prostatic Hyperplasia: Care Instructions  Your Care Instructions    Benign prostatic hyperplasia, or BPH, is an enlarged prostate gland. The prostate is a small gland that makes some of the fluid in semen. Prostate enlargement happens to almost all men as they age. It is usually not serious. BPH does not cause prostate cancer. As the prostate gets bigger, it may partly block the flow of urine. You may have a hard time getting a urine stream started or completely stopped. BPH can cause dribbling. You may have a weak urine stream, or you may have to urinate more often than you used to, especially at night. Most men find these problems easy to manage. You do not need treatment unless your symptoms bother you a lot or you have other problems, such as bladder infections or stones. In these cases, medicines may help. Surgery is not needed unless the urine flow is blocked or the symptoms do not get better with medicine. Follow-up care is a key part of your treatment and safety. Be sure to make and go to all appointments, and call your doctor if you are having problems. It's also a good idea to know your test results and keep a list of the medicines you take. How can you care for yourself at home? · Take plenty of time to urinate. Try to relax. · Try \"double voiding. \" Urinate as much you can, relax for a few moments, and then try to urinate again. · Sit on the toilet to urinate. · Read or think of other things while you are waiting. · Turn on a faucet, or try to picture running water. Some men find that this helps get their urine flowing. · If dribbling is a problem, wash your penis daily to avoid skin irritation and infection. · Avoid caffeine and alcohol. These drinks will increase how often you need to urinate. Spread your fluid intake throughout the day. If the urge to urinate often wakes you at night, limit your fluid intake in the evening.  Urinate right before you go to bed.  · Many over-the-counter cold and allergy medicines can make the symptoms of BPH worse. Avoid antihistamines, decongestants, and allergy pills, if you can. Read the warnings on the package. · If you take any prescription medicines, especially tranquilizers or antidepressants, ask your doctor or pharmacist whether they can cause urination problems. There may be other medicines you can use that do not cause urinary problems. · Be safe with medicines. Take your medicines exactly as prescribed. Call your doctor if you think you are having a problem with your medicine. When should you call for help? Call your doctor now or seek immediate medical care if:    · You cannot urinate at all.     · You have symptoms of a urinary infection. For example:  ? You have blood or pus in your urine. ? You have pain in your back just below your rib cage. This is called flank pain. ? You have a fever, chills, or body aches. ? It hurts to urinate. ? You have groin or belly pain.    Watch closely for changes in your health, and be sure to contact your doctor if:    · It hurts when you ejaculate.     · Your urinary problems get a lot worse or bother you a lot. Where can you learn more? Go to http://dusty-cheryl.info/. Enter I097 in the search box to learn more about \"Benign Prostatic Hyperplasia: Care Instructions. \"  Current as of: May 28, 2019  Content Version: 12.2  © 5902-3360 [employer]. Care instructions adapted under license by "[employer] (which disclaims liability or warranty for this information). If you have questions about a medical condition or this instruction, always ask your healthcare professional. [place] [address] any warranty or liability for your use of this information. Patient Education        Learning About a Radical Prostatectomy  What is a radical prostatectomy?     A radical prostatectomy is surgery to remove the prostate gland and some of the tissue near it. It is done to treat prostate cancer that has not spread out of the prostate. The prostate gland is a small, walnut-shaped organ. It lies just below a man's bladder. It surrounds the urethra. That's the tube that carries urine from the bladder out of the body through the penis. The prostate gland makes most of the fluid in semen. When you find out that you have cancer, you may feel many emotions and need help coping. It may help to talk with your family, friends, or a therapist about your feelings. You also can do things at home to make yourself feel better while you go through treatment. Call the [employer] (9-852.135.1917) or visit its website at [employer] to learn more. How is this surgery done? There are three main types of this surgery. You and your doctor can choose which type is right for you. During surgery, the nerves that run along the sides of the prostate may be damaged or removed. These nerves affect whether a man can have an erection. In some cases, the doctor may be able to avoid damage to these nerves. This is called nerve-sparing surgery. It makes it more likely that a man can still have an erection after surgery. Radical retropubic prostatectomy  The doctor makes a 3- to 4-inch cut (incision) in the skin between the belly button and the pubic bone. This is done to remove the prostate. Nerve-sparing may be possible with this approach. Radical perineal prostatectomy  The doctor makes a cut between the scrotum and anus. The prostate and other tissue are then removed. This surgery does not take as long as a retropubic surgery. And it may cause less pain. But nerve-sparing is hard to do with this approach. So this surgery is not done as often as a retropubic prostatectomy. Laparoscopic radical prostatectomy  The doctor puts a lighted tube, or scope, and other tools through a few small cuts in the lower belly.  Nerve-sparing may be possible with this approach. This surgery may also be done with the help of a robot (robotic-assisted). Robotic arms translate the surgeon's hand motions into finer and more precise action. What can you expect after a radical prostatectomy? You will likely stay in the hospital for 1 to 3 days after surgery. Most men can go back to work or their usual routine in about 3 to 5 weeks. But it can take longer to fully recover. After your surgery, you will need to have routine checks with your doctor. This includes having blood tests to measure your PSA level. PSA is a substance that can help show if your cancer has come back. PSA tests are usually done every 3 to 6 months for the first 3 years. After that they are done less often. You may not need any more cancer treatment after surgery. But in some cases, you may need radiation or hormone therapy. Erection problems and fertility  Whether you can have an erection after surgery will depend on:  · Your age. · Whether you could have erections before the surgery. · Whether the nerves near your prostate were damaged or removed. If the doctor removes the nerves, you will need to use medicine or other treatment to have erections from now on. If your nerves are still in place, you may not need long-term medicines or treatments. As a rule, the younger you are, the better your chances that you will still be able to get an erection. Even if your nerves are not damaged, it will probably take 3 to 12 months after surgery before you will be able to have an erection on your own. During this time, you can use medicine or other treatments to get an erection. If you have this surgery, you won't be able to ejaculate sperm. That's because the surgery cuts the connection between the testicles and the penis. If you want to be able to father children, talk to your doctor about your options. You may be able to save your sperm before the surgery.   Urinary incontinence  After this surgery, some men may not be able to control when they pass urine. Or they may leak urine. This is called urinary incontinence. It may go away within weeks or months after surgery. Or it may never go away. Some men will have leakage only once in a while. But other men may find it hard to control their urine all the time. Doctors can't say who will have problems and who will not. And they can't tell how long after surgery the problem may last. Medicine and exercises to strengthen the muscles that control the bladder can often help. Follow-up care is a key part of your treatment and safety. Be sure to make and go to all appointments, and call your doctor if you are having problems. It's also a good idea to know your test results and keep a list of the medicines you take. Where can you learn more? Go to http://dusty-cheryl.info/. Enter A647 in the search box to learn more about \"Learning About a Radical Prostatectomy. \"  Current as of: December 19, 2018  Content Version: 12.2  © 3141-8289 [employer]. Care instructions adapted under license by [employer] (which disclaims liability or warranty for this information). If you have questions about a medical condition or this instruction, always ask your healthcare professional. [place] [address] any warranty or liability for your use of this information. DISCHARGE SUMMARY from Nurse    PATIENT INSTRUCTIONS:    After general anesthesia or intravenous sedation, for 24 hours or while taking prescription Narcotics:  · Limit your activities  · Do not drive and operate hazardous machinery  · Do not make important personal or business decisions  · Do  not drink alcoholic beverages  · If you have not urinated within 8 hours after discharge, please contact your surgeon on call.     Report the following to your surgeon:  · Excessive pain, swelling, redness or odor of or around the surgical area  · Temperature over 100.5  · Nausea and vomiting lasting longer than 4 hours or if unable to take medications  · Any signs of decreased circulation or nerve impairment to extremity: change in color, persistent  numbness, tingling, coldness or increase pain  · Any questions    What to do at Home:  Recommended activity: Activity as tolerated,     If you experience any of the following symptoms fever greater then 100.5, pain unrelieved by medications, increase in shortness of breath, please follow up with primary care doctor. *  Please give a list of your current medications to your Primary Care Provider. *  Please update this list whenever your medications are discontinued, doses are      changed, or new medications (including over-the-counter products) are added. *  Please carry medication information at all times in case of emergency situations. These are general instructions for a healthy lifestyle:    No smoking/ No tobacco products/ Avoid exposure to second hand smoke  Surgeon General's Warning:  Quitting smoking now greatly reduces serious risk to your health. Obesity, smoking, and sedentary lifestyle greatly increases your risk for illness    A healthy diet, regular physical exercise & weight monitoring are important for maintaining a healthy lifestyle    You may be retaining fluid if you have a history of heart failure or if you experience any of the following symptoms:  Weight gain of 3 pounds or more overnight or 5 pounds in a week, increased swelling in our hands or feet or shortness of breath while lying flat in bed. Please call your doctor as soon as you notice any of these symptoms; do not wait until your next office visit. The discharge information has been reviewed with the patient. The patient verbalized understanding.   Discharge medications reviewed with the patient and appropriate educational materials and side effects teaching were provided.   ___________________________________________________________________________________________________________________________________

## 2019-10-05 NOTE — PROGRESS NOTES
Subjective:   Daily Progress Note: 10/5/2019 3:12 PM  No Complaints. Has been walking in halls. Objective:     Visit Vitals  /63   Pulse 81   Temp 99 °F (37.2 °C)   Resp 19   Ht 6' (1.829 m)   Wt 196 lb 4 oz (89 kg)   SpO2 95%   BMI 26.62 kg/m²    O2 Flow Rate (L/min): 2 l/min O2 Device: Room air    Temp (24hrs), Av.9 °F (37.2 °C), Min:98.6 °F (37 °C), Max:99.5 °F (37.5 °C)      10/03 1901 - 10/05 0700  In: 73173.8 [I.V.:868.3]  Out: 57032 [Urine:23836; Drains:15]  10/05 0701 - 10/05 1900  In: 120 [P.O.:120]  Out: 5000 [Urine:5000]    [unfilled]  [unfilled]  [unfilled]    Exam: abdomen soft, nontender. Urethral chun urine is pink. Draining well with minimal CBI. Data Review    Recent Results (from the past 24 hour(s))   HGB & HCT    Collection Time: 10/05/19  5:20 AM   Result Value Ref Range    HGB 8.2 (L) 13.6 - 17.2 g/dL    HCT 25.6 (L) 41.1 - 50.3 %       Assessment   Active Problems:    BPH (benign prostatic hyperplasia) (10/3/2019)        Plan:  hgb 8.2 today, from 8.8 yesterday. Will discharge home. Plug SP tube, urethral chun to drain.

## 2019-10-15 NOTE — DISCHARGE SUMMARY
[facility]  DISCHARGE SUMMARY    Name:  Fide Pedersen  MR#:  [id number]  :  1940  ACCOUNT #:  [de-identified]  ADMIT DATE:  10/02/2019  DISCHARGE DATE:  10/05/2019    DISCHARGE DIAGNOSES:  Benign prostatic hypertrophy, osteoarthritis, history of elevated prostate-specific antigen, hiatal hernia, hypertension, sleep apnea. PROCEDURE:  Open simple suprapubic prostatectomy on 10/02/2019. CLINICAL HISTORY:  This is a 71-year-old gentleman recently found to be in acute urinary retention. Previous cystoscopy showed trilobar hypertrophy of the prostate. He has also been followed for an elevated PSA with a negative prostate biopsy in the distant past.  All risks, benefits and alternatives to the above-mentioned procedure have been reviewed and he is willing to proceed at this time. DESCRIPTION OF HOSPITAL COURSE:  The patient underwent an uneventful open simple suprapubic prostatectomy. EBL was 1200 mL. He was transferred to the floor postoperatively at which time he was started on clear liquid diet. Continuous bladder irrigation was initiated through his suprapubic tube and out the urethral Lloyd. He began ambulating on postoperative day #1 and his continuous bladder irrigation was slowly titrated. His diet was advanced to a full liquid diet on postoperative day #2. By postoperative day #3, his diet was advanced to a regular diet and his hemoglobin had slowly dropped to 8.8. On 10/04/2019, his renal function improved. By postoperative day #5, he was ambulating well. His continuous bladder irrigation had been stopped and the suprapubic tube capped. His urethral Lloyd was left to dependent drainage. His hemoglobin appeared stable at 8.2 and decision was made to discharge the patient home. He was instructed to resume all prior home medications. His suprapubic was left capped and the urethral Lloyd was left to dependent drainage.   New prescriptions were given; however, this is not available at the time of this dictation. He was instructed to follow up with me in one week for staple removal and possible urethral Lloyd removal.  He was instructed to call for any problems or concerns.       DO RISSA [email or web address]  D:  10/14/2019 13:06  T:  10/14/2019 23:10  JOB #:  [id number]

## 2020-01-28 PROBLEM — H53.2 DIPLOPIA: Status: ACTIVE | Noted: 2020-01-28

## 2020-01-28 PROBLEM — R41.3 MEMORY DIFFICULTY: Status: ACTIVE | Noted: 2020-01-28

## 2020-06-29 ENCOUNTER — HOSPITAL ENCOUNTER (OUTPATIENT)
Age: 80
Setting detail: OUTPATIENT SURGERY
End: 2020-06-29
Attending: SURGERY | Admitting: SURGERY

## 2020-06-29 PROBLEM — K43.2 INCISIONAL HERNIA OF ANTERIOR ABDOMINAL WALL WITHOUT OBSTRUCTION OR GANGRENE: Status: ACTIVE | Noted: 2020-06-29

## 2020-06-29 PROBLEM — Z90.79: Status: ACTIVE | Noted: 2020-06-29

## 2020-06-30 RX ORDER — SODIUM CHLORIDE 0.9 % (FLUSH) 0.9 %
5-40 SYRINGE (ML) INJECTION EVERY 8 HOURS
Status: CANCELLED | OUTPATIENT
Start: 2020-06-30

## 2020-06-30 RX ORDER — SODIUM CHLORIDE 0.9 % (FLUSH) 0.9 %
5-40 SYRINGE (ML) INJECTION AS NEEDED
Status: CANCELLED | OUTPATIENT
Start: 2020-06-30

## 2020-07-29 RX ORDER — SODIUM CHLORIDE 0.9 % (FLUSH) 0.9 %
5-40 SYRINGE (ML) INJECTION EVERY 8 HOURS
Status: CANCELLED | OUTPATIENT
Start: 2020-07-29

## 2020-07-29 RX ORDER — SODIUM CHLORIDE 0.9 % (FLUSH) 0.9 %
5-40 SYRINGE (ML) INJECTION AS NEEDED
Status: CANCELLED | OUTPATIENT
Start: 2020-07-29

## 2020-08-07 ENCOUNTER — HOSPITAL ENCOUNTER (OUTPATIENT)
Dept: LAB | Age: 80
Discharge: HOME | End: 2020-08-07
Payer: MEDICARE

## 2020-08-10 ENCOUNTER — ANESTHESIA EVENT (OUTPATIENT)
Dept: SURGERY | Age: 80
End: 2020-08-10
Payer: MEDICARE

## 2020-08-10 NOTE — H&P
[address]  [place], [address] [facility]  [phone]    H&P Note   Keyonna Ward   MRN: [id number]     : 1940        HPI: Keyonna Ward is a [de-identified] y.o. male who is referred by Dr. Pilar Manjarrez for lower midline incisional hernia. Patient had transabdominal simple prostatectomy in 2019 for acute urinary retention. He had suprapubic catheter placed that was removed at 2 weeks. He did very well following the surgery and has no further urinary obstructive symptoms. He does have some leakage and wears a pad. He does complain of some orchialgia which he has discussed with Dr. Pilar Manjarrez. On review of his operative report, he had #1 PDS running closure of his lower midline incision. He does not report having had any issues with healing. There is no report of wound disruption. He was noted to have bulging and evidence of incisional hernia at his postoperative follow-up visit in 2020. His referral has been delayed secondary to COVID-19. He presents today with complaints of a bulge, but no significant symptoms. Bulge resolves with supine positioning. He denies any bowel obstructive symptoms. He denies any issues with urination other than mild leakage. He also has a history of bilateral inguinal hernia repairs. He had bilateral repairs as an adolescent. He had a recurrent inguinal hernia repair on the left with mesh 5 or 6 years ago by Dr. Jose Godinez. There are no records of the most recent procedure in our system or in care everywhere. He reports that he also had his gallbladder removed by laparoscopic cholecystectomy by Dr. Jose Godinez close to that same timeframe. He has not noticed any bulging or symptoms related to his groins. He is retired. 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 He admits that he may have lifted more than the recommended amount during his postoperative recovery.   He does not recall any popping events. 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    BPH (benign prostatic hypertrophy) 12/4/2013    Colon polyps     Diplopia 1/28/2020    ED (erectile dysfunction)     Elevated prostate specific antigen (PSA)     Hiatal hernia     HTN (hypertension) 12/4/2013 8/6/2020 Pt states never prescribed medication    Impotence of organic origin     Leukoplakia of lips     Memory difficulty 1/28/2020    Ocular migraine     x 1    Peripheral neuropathy     Sleep apnea     borderline, no cpap    Spinal stenosis     Ventral hernia     Vertigo 12/4/2013     Past Surgical History:   Procedure Laterality Date    HX HERNIA REPAIR Left 1955    left inguinal    HX HERNIA REPAIR Right     inguinal    HX HIP REPLACEMENT Right 2007    HX LAP CHOLECYSTECTOMY  2007    HX ROTATOR CUFF REPAIR Right 2013    HX TONSILLECTOMY  childhood     No current facility-administered medications for this encounter. No current outpatient medications on file. ALLERGIES:  Multihance [gadobenate dimeglumine]    Social History     Socioeconomic History    Marital status:      Spouse name: Not on file    Number of children: Not on file    Years of education: Not on file    Highest education level: Not on file   Tobacco Use    Smoking status: Never Smoker    Smokeless tobacco: Former User   Substance and Sexual Activity    Alcohol use: Not Currently     Comment: rare occassion    Drug use: No     Social History     Tobacco Use   Smoking Status Never Smoker   Smokeless Tobacco Former User     Family History   Problem Relation Age of Onset    Suicide Mother 61    Psychiatric Disorder Mother     Other Sister         major head injury        ROS: The patient has no difficulty with chest pain or shortness of breath. No fever or chills. The patient denies any personal or family history of abnormal clotting or bleeding. Comprehensive review of systems was otherwise unremarkable except as noted above. Physical Exam:   Constitutional: Alert oriented cooperative patient in no acute distress. There were no vitals taken for this visit. Visit Vitals  /82   Pulse 96   Ht 6' (1.829 m)   Wt 207 lb (93.9 kg)   SpO2 96%   BMI 28.07 kg/m²     Eyes:Sclera are clear without icterus. ENMT: no obvious neck masses, no ear or lip lesions  CV: RRR. Normal perfusion  Resp: No JVD. Breathing is  non-labored. GI: soft and non-distended, well-healed lower midline incision scar and LEIGH ANN drain and SP catheter drain site scars, examination in standing and supine positions with straining reveals obvious bulging larger than a baseball when standing and straining. Complete reduction of bulging in supine position. Minimal retraction of midline musculature when supine and not straining with obvious palpable hernia defects between the intact muscles. : Leakage pad present, well-healed inguinal incision scars, no palpable inguinal hernias appreciated in supine position with straining     Musculoskeletal: unremarkable with normal function. Neuro:  No obvious focal deficits  Psychiatric: normal affect and mood, no memory impairment    Lab Results   Component Value Date/Time    WBC 14.3 (H) 10/04/2019 05:39 AM    HGB 15.6 08/07/2020 10:42 AM    HCT 25.6 (L) 10/05/2019 05:20 AM    PLATELET 288 02/55/3770 05:39 AM    MCV 98.2 (H) 10/04/2019 05:39 AM       Lab Results   Component Value Date/Time    Sodium 138 10/04/2019 05:39 AM    Potassium 4.3 10/04/2019 05:39 AM    Chloride 106 10/04/2019 05:39 AM    CO2 25 10/04/2019 05:39 AM    BUN 16 10/04/2019 05:39 AM    Creatinine 1.17 10/04/2019 05:39 AM    Glucose 120 (H) 10/04/2019 05:39 AM    Bilirubin, total 0.9 08/16/2017 10:45 AM    ALT (SGPT) 22 08/16/2017 10:45 AM    Alk.  phosphatase 124 08/16/2017 10:45 AM       Assessment/Plan:     Keyonna Ward is a [de-identified] y.o. male who has a lower midline ventral incisional hernia secondary to transabdominal simple prostatectomy performed in October 2019. He may have disrupted his repair that has led to the hernia. His midline rectus muscles remain close together and he may be a good candidate for a simple repair. I think waiting on repair will lead to progression of the size of the hernia. I think it is best to proceed with repair. We discussed also possible reinforcement with long-duration absorbable mesh. We discussed this as a method to avoid permanent mesh if possible with this repair. We could go on to perform a permanent mesh repair via a preperitoneal approach if he develops a recurrence. We discussed the need to wear an abdominal binder postoperatively and the need to refrain from lifting greater than 10 pounds for minimum of 6 weeks postoperatively. Discussed proceeding with open initial ventral incisional hernia repair, possible mesh. We will use either Phasix absorbable mesh or permanent mesh if needed. I discussed the patient's condition and treatment options with the patient. I discussed risks of surgery in language the patient could understand including bleeding, infection, recurrence, mesh complications, chronic pain, allergic reaction, need for further surgery, abscess, fistula, SBO, DVT, PE, heart attack, stroke, renal failure, respiratory failure, ventilatory dependence, and death. The patient voiced understanding of all this and all questions were answered. Alternatives to surgery were discussed also and risks of the alternatives. The patient requested that we proceed with surgery. Informed consent was obtained.      Problem List  Date Reviewed: 7/27/2020          Codes Class Noted    Incisional hernia of anterior abdominal wall without obstruction or gangrene ICD-10-CM: K43.2  ICD-9-CM: 553.21  6/29/2020        Hx of abdominal prostatectomy ICD-10-CM: Z90.79  ICD-9-CM: V15.29  6/29/2020        Diplopia ICD-10-CM: H53.2  ICD-9-CM: 368.2  1/28/2020        Memory difficulty ICD-10-CM: R41.3  ICD-9-CM: 780.93  1/28/2020        BPH (benign prostatic hyperplasia) ICD-10-CM: N40.0  ICD-9-CM: 600.00  10/3/2019        Axonal polyneuropathy ICD-10-CM: G62.9  ICD-9-CM: 356.9  1/15/2018        B12 deficiency ICD-10-CM: E53.8  ICD-9-CM: 266.2  1/5/2018        Benign prostatic hyperplasia without lower urinary tract symptoms ICD-10-CM: N40.0  ICD-9-CM: 600.00  12/14/2017        Obstructive sleep apnea ICD-10-CM: G47.33  ICD-9-CM: 327.23  7/16/2014        Periodic limb movement disorder ICD-10-CM: G47.61  ICD-9-CM: 327.51  7/16/2014        Vertigo ICD-10-CM: R42  ICD-9-CM: 780.4  12/4/2013        HTN (hypertension) ICD-10-CM: I10  ICD-9-CM: 401.9  12/4/2013                Dolores Beltran MD,  FACS

## 2020-08-11 ENCOUNTER — ANESTHESIA (OUTPATIENT)
Dept: SURGERY | Age: 80
End: 2020-08-11
Payer: MEDICARE

## 2020-08-11 ENCOUNTER — HOSPITAL ENCOUNTER (OUTPATIENT)
Age: 80
Setting detail: OUTPATIENT SURGERY
Discharge: HOME | End: 2020-08-11
Attending: SURGERY | Admitting: SURGERY
Payer: MEDICARE

## 2020-08-11 VITALS
DIASTOLIC BLOOD PRESSURE: 78 MMHG | OXYGEN SATURATION: 96 % | BODY MASS INDEX: 27.98 KG/M2 | RESPIRATION RATE: 16 BRPM | SYSTOLIC BLOOD PRESSURE: 142 MMHG | WEIGHT: 206.6 LBS | HEIGHT: 72 IN | TEMPERATURE: 97.9 F | HEART RATE: 85 BPM

## 2020-08-11 DIAGNOSIS — K43.2 INCISIONAL HERNIA OF ANTERIOR ABDOMINAL WALL WITHOUT OBSTRUCTION OR GANGRENE: Primary | ICD-10-CM

## 2020-08-11 PROCEDURE — 74011000272 HC RX REV CODE- 272: Performed by: SURGERY

## 2020-08-11 PROCEDURE — 76060000036 HC ANESTHESIA 2.5 TO 3 HR: Performed by: SURGERY

## 2020-08-11 PROCEDURE — 77030036554: Performed by: SURGERY

## 2020-08-11 PROCEDURE — 74011250636 HC RX REV CODE- 250/636: Performed by: SURGERY

## 2020-08-11 PROCEDURE — 76010000172 HC OR TIME 2.5 TO 3 HR INTENSV-TIER 1: Performed by: SURGERY

## 2020-08-11 PROCEDURE — 74011250636 HC RX REV CODE- 250/636: Performed by: NURSE ANESTHETIST, CERTIFIED REGISTERED

## 2020-08-11 PROCEDURE — 77030002966 HC SUT PDS J&J -A: Performed by: SURGERY

## 2020-08-11 PROCEDURE — 74011000250 HC RX REV CODE- 250: Performed by: NURSE ANESTHETIST, CERTIFIED REGISTERED

## 2020-08-11 PROCEDURE — 77030040361 HC SLV COMPR DVT MDII -B: Performed by: SURGERY

## 2020-08-11 PROCEDURE — 74011250636 HC RX REV CODE- 250/636: Performed by: ANESTHESIOLOGY

## 2020-08-11 PROCEDURE — 77030040922 HC BLNKT HYPOTHRM STRY -A: Performed by: ANESTHESIOLOGY

## 2020-08-11 PROCEDURE — 77030010507 HC ADH SKN DERMBND J&J -B: Performed by: SURGERY

## 2020-08-11 PROCEDURE — 74011000250 HC RX REV CODE- 250: Performed by: SURGERY

## 2020-08-11 PROCEDURE — 77030019908 HC STETH ESOPH SIMS -A: Performed by: ANESTHESIOLOGY

## 2020-08-11 PROCEDURE — 77030039425 HC BLD LARYNG TRULITE DISP TELE -A: Performed by: ANESTHESIOLOGY

## 2020-08-11 PROCEDURE — C1781 MESH (IMPLANTABLE): HCPCS | Performed by: SURGERY

## 2020-08-11 PROCEDURE — 76210000006 HC OR PH I REC 0.5 TO 1 HR: Performed by: SURGERY

## 2020-08-11 PROCEDURE — 77030037088 HC TUBE ENDOTRACH ORAL NSL COVD-A: Performed by: ANESTHESIOLOGY

## 2020-08-11 PROCEDURE — 76210000020 HC REC RM PH II FIRST 0.5 HR: Performed by: SURGERY

## 2020-08-11 PROCEDURE — 77030031139 HC SUT VCRL2 J&J -A: Performed by: SURGERY

## 2020-08-11 RX ORDER — OXYCODONE HYDROCHLORIDE 5 MG/1
10 TABLET ORAL
Status: DISCONTINUED | OUTPATIENT
Start: 2020-08-11 | End: 2020-08-11 | Stop reason: HOSPADM

## 2020-08-11 RX ORDER — DIPHENHYDRAMINE HYDROCHLORIDE 50 MG/ML
12.5 INJECTION, SOLUTION INTRAMUSCULAR; INTRAVENOUS
Status: DISCONTINUED | OUTPATIENT
Start: 2020-08-11 | End: 2020-08-11 | Stop reason: HOSPADM

## 2020-08-11 RX ORDER — EPHEDRINE SULFATE/0.9% NACL/PF 50 MG/5 ML
SYRINGE (ML) INTRAVENOUS AS NEEDED
Status: DISCONTINUED | OUTPATIENT
Start: 2020-08-11 | End: 2020-08-11 | Stop reason: HOSPADM

## 2020-08-11 RX ORDER — CEFAZOLIN SODIUM/WATER 2 G/20 ML
2 SYRINGE (ML) INTRAVENOUS ONCE
Status: COMPLETED | OUTPATIENT
Start: 2020-08-11 | End: 2020-08-11

## 2020-08-11 RX ORDER — LIDOCAINE HYDROCHLORIDE 10 MG/ML
0.1 INJECTION INFILTRATION; PERINEURAL AS NEEDED
Status: DISCONTINUED | OUTPATIENT
Start: 2020-08-11 | End: 2020-08-11 | Stop reason: HOSPADM

## 2020-08-11 RX ORDER — FENTANYL CITRATE 50 UG/ML
INJECTION, SOLUTION INTRAMUSCULAR; INTRAVENOUS AS NEEDED
Status: DISCONTINUED | OUTPATIENT
Start: 2020-08-11 | End: 2020-08-11 | Stop reason: HOSPADM

## 2020-08-11 RX ORDER — ONDANSETRON 2 MG/ML
INJECTION INTRAMUSCULAR; INTRAVENOUS AS NEEDED
Status: DISCONTINUED | OUTPATIENT
Start: 2020-08-11 | End: 2020-08-11 | Stop reason: HOSPADM

## 2020-08-11 RX ORDER — GLYCOPYRROLATE 0.2 MG/ML
INJECTION INTRAMUSCULAR; INTRAVENOUS AS NEEDED
Status: DISCONTINUED | OUTPATIENT
Start: 2020-08-11 | End: 2020-08-11 | Stop reason: HOSPADM

## 2020-08-11 RX ORDER — SODIUM CHLORIDE, SODIUM LACTATE, POTASSIUM CHLORIDE, CALCIUM CHLORIDE 600; 310; 30; 20 MG/100ML; MG/100ML; MG/100ML; MG/100ML
100 INJECTION, SOLUTION INTRAVENOUS CONTINUOUS
Status: DISCONTINUED | OUTPATIENT
Start: 2020-08-11 | End: 2020-08-11 | Stop reason: HOSPADM

## 2020-08-11 RX ORDER — ROCURONIUM BROMIDE 10 MG/ML
INJECTION, SOLUTION INTRAVENOUS AS NEEDED
Status: DISCONTINUED | OUTPATIENT
Start: 2020-08-11 | End: 2020-08-11 | Stop reason: HOSPADM

## 2020-08-11 RX ORDER — ONDANSETRON 4 MG/1
4 TABLET, ORALLY DISINTEGRATING ORAL
Qty: 30 TAB | Refills: 1 | Status: SHIPPED | OUTPATIENT
Start: 2020-08-11 | End: 2020-08-25

## 2020-08-11 RX ORDER — HYDROMORPHONE HYDROCHLORIDE 2 MG/ML
0.5 INJECTION, SOLUTION INTRAMUSCULAR; INTRAVENOUS; SUBCUTANEOUS
Status: DISCONTINUED | OUTPATIENT
Start: 2020-08-11 | End: 2020-08-11 | Stop reason: HOSPADM

## 2020-08-11 RX ORDER — NEOSTIGMINE METHYLSULFATE 1 MG/ML
INJECTION, SOLUTION INTRAVENOUS AS NEEDED
Status: DISCONTINUED | OUTPATIENT
Start: 2020-08-11 | End: 2020-08-11 | Stop reason: HOSPADM

## 2020-08-11 RX ORDER — LIDOCAINE HYDROCHLORIDE 20 MG/ML
INJECTION, SOLUTION EPIDURAL; INFILTRATION; INTRACAUDAL; PERINEURAL AS NEEDED
Status: DISCONTINUED | OUTPATIENT
Start: 2020-08-11 | End: 2020-08-11 | Stop reason: HOSPADM

## 2020-08-11 RX ORDER — FLUMAZENIL 0.1 MG/ML
0.2 INJECTION INTRAVENOUS
Status: DISCONTINUED | OUTPATIENT
Start: 2020-08-11 | End: 2020-08-11 | Stop reason: HOSPADM

## 2020-08-11 RX ORDER — SODIUM CHLORIDE 0.9 % (FLUSH) 0.9 %
5-40 SYRINGE (ML) INJECTION AS NEEDED
Status: DISCONTINUED | OUTPATIENT
Start: 2020-08-11 | End: 2020-08-11 | Stop reason: HOSPADM

## 2020-08-11 RX ORDER — DOCUSATE SODIUM 100 MG/1
100 CAPSULE, LIQUID FILLED ORAL 2 TIMES DAILY
Qty: 60 CAP | Refills: 0 | Status: SHIPPED | OUTPATIENT
Start: 2020-08-11 | End: 2020-08-25

## 2020-08-11 RX ORDER — PROPOFOL 10 MG/ML
INJECTION, EMULSION INTRAVENOUS AS NEEDED
Status: DISCONTINUED | OUTPATIENT
Start: 2020-08-11 | End: 2020-08-11 | Stop reason: HOSPADM

## 2020-08-11 RX ORDER — DEXAMETHASONE SODIUM PHOSPHATE 4 MG/ML
INJECTION, SOLUTION INTRA-ARTICULAR; INTRALESIONAL; INTRAMUSCULAR; INTRAVENOUS; SOFT TISSUE AS NEEDED
Status: DISCONTINUED | OUTPATIENT
Start: 2020-08-11 | End: 2020-08-11 | Stop reason: HOSPADM

## 2020-08-11 RX ORDER — TRAMADOL HYDROCHLORIDE 50 MG/1
50-100 TABLET ORAL
Qty: 40 TAB | Refills: 0 | Status: SHIPPED | OUTPATIENT
Start: 2020-08-11 | End: 2020-08-16

## 2020-08-11 RX ORDER — BUPIVACAINE HYDROCHLORIDE AND EPINEPHRINE 2.5; 5 MG/ML; UG/ML
INJECTION, SOLUTION EPIDURAL; INFILTRATION; INTRACAUDAL; PERINEURAL AS NEEDED
Status: DISCONTINUED | OUTPATIENT
Start: 2020-08-11 | End: 2020-08-11 | Stop reason: HOSPADM

## 2020-08-11 RX ORDER — SODIUM CHLORIDE 0.9 % (FLUSH) 0.9 %
5-40 SYRINGE (ML) INJECTION EVERY 8 HOURS
Status: DISCONTINUED | OUTPATIENT
Start: 2020-08-11 | End: 2020-08-11 | Stop reason: HOSPADM

## 2020-08-11 RX ORDER — NALOXONE HYDROCHLORIDE 0.4 MG/ML
0.2 INJECTION, SOLUTION INTRAMUSCULAR; INTRAVENOUS; SUBCUTANEOUS AS NEEDED
Status: DISCONTINUED | OUTPATIENT
Start: 2020-08-11 | End: 2020-08-11 | Stop reason: HOSPADM

## 2020-08-11 RX ORDER — OXYCODONE HYDROCHLORIDE 5 MG/1
5 TABLET ORAL
Status: DISCONTINUED | OUTPATIENT
Start: 2020-08-11 | End: 2020-08-11 | Stop reason: HOSPADM

## 2020-08-11 RX ORDER — ACETAMINOPHEN 500 MG
1000 TABLET ORAL ONCE
Status: DISCONTINUED | OUTPATIENT
Start: 2020-08-11 | End: 2020-08-11 | Stop reason: HOSPADM

## 2020-08-11 RX ADMIN — ROCURONIUM BROMIDE 10 MG: 10 INJECTION, SOLUTION INTRAVENOUS at 08:06

## 2020-08-11 RX ADMIN — Medication 10 MG: at 08:23

## 2020-08-11 RX ADMIN — Medication 10 MG: at 08:05

## 2020-08-11 RX ADMIN — ONDANSETRON 4 MG: 2 INJECTION INTRAMUSCULAR; INTRAVENOUS at 09:26

## 2020-08-11 RX ADMIN — Medication 2 G: at 07:50

## 2020-08-11 RX ADMIN — LIDOCAINE HYDROCHLORIDE 80 MG: 20 INJECTION, SOLUTION EPIDURAL; INFILTRATION; INTRACAUDAL; PERINEURAL at 07:33

## 2020-08-11 RX ADMIN — ROCURONIUM BROMIDE 10 MG: 10 INJECTION, SOLUTION INTRAVENOUS at 08:30

## 2020-08-11 RX ADMIN — PROPOFOL 170 MG: 10 INJECTION, EMULSION INTRAVENOUS at 07:33

## 2020-08-11 RX ADMIN — DEXAMETHASONE SODIUM PHOSPHATE 4 MG: 4 INJECTION, SOLUTION INTRAMUSCULAR; INTRAVENOUS at 08:31

## 2020-08-11 RX ADMIN — FENTANYL CITRATE 50 MCG: 50 INJECTION INTRAMUSCULAR; INTRAVENOUS at 07:59

## 2020-08-11 RX ADMIN — FENTANYL CITRATE 50 MCG: 50 INJECTION INTRAMUSCULAR; INTRAVENOUS at 07:33

## 2020-08-11 RX ADMIN — ROCURONIUM BROMIDE 10 MG: 10 INJECTION, SOLUTION INTRAVENOUS at 08:59

## 2020-08-11 RX ADMIN — ROCURONIUM BROMIDE 40 MG: 10 INJECTION, SOLUTION INTRAVENOUS at 07:33

## 2020-08-11 RX ADMIN — Medication 5 MG: at 07:57

## 2020-08-11 RX ADMIN — GLYCOPYRROLATE 0.8 MG: 0.2 INJECTION, SOLUTION INTRAMUSCULAR; INTRAVENOUS at 09:40

## 2020-08-11 RX ADMIN — Medication 5 MG: at 09:40

## 2020-08-11 RX ADMIN — SODIUM CHLORIDE, SODIUM LACTATE, POTASSIUM CHLORIDE, AND CALCIUM CHLORIDE 100 ML/HR: 600; 310; 30; 20 INJECTION, SOLUTION INTRAVENOUS at 06:08

## 2020-08-11 NOTE — DISCHARGE INSTRUCTIONS
Leave dressing for 4 days, then remove. Leave glue dressing intact until seen in follow up. Abdominal binder at all times except in shower until seen in follow up. Diet: sips of clear liquids today. Clear liquid diet in AM then advance as tolerated over next few days as nausea improves and function becomes more normal.  OK to be on liquids for a few days. OK to shower tomorrow but do not spray water directly into wound. No heavy lifting (>10lbs) for 6 weeks to reduce risk of disrupting hernia repair. May resume normal activity including walking, which is encouraged to reduce risk of blood clots. No driving (if can currently drive) until you are completely off pain meds for 24hrs and have no pain with movements associated with driving. Pain and nausea prescriptions on chart for patient to use as needed   Colace (stool softener) to help prevent straining at stool. If constipation occurs, then treat with Milk of Magnesia. DO NOT STRAIN. Follow-up with Dr Kathi Seip in 2 weeks ([phone])     After general anesthesia or intravenous sedation, for 24 hours or while taking prescription Narcotics:  · Limit your activities  · A responsible adult needs to be with you for the next 24 hours  · Do not drive and operate hazardous machinery  · Do not make important personal or business decisions  · Do not drink alcoholic beverages  · If you have not urinated within 8 hours after discharge, please contact your surgeon on call. · If you have sleep apnea and have a CPAP machine, please use it for all naps and sleeping. · Please use caution when taking narcotics and any of your home medications that may cause drowsiness. *  Please give a list of your current medications to your Primary Care Provider. *  Please update this list whenever your medications are discontinued, doses are      changed, or new medications (including over-the-counter products) are added.   *  Please carry medication information at all times in case of emergency situations. These are general instructions for a healthy lifestyle:  No smoking/ No tobacco products/ Avoid exposure to second hand smoke  Surgeon General's Warning:  Quitting smoking now greatly reduces serious risk to your health. Obesity, smoking, and sedentary lifestyle greatly increases your risk for illness  A healthy diet, regular physical exercise & weight monitoring are important for maintaining a healthy lifestyle    You may be retaining fluid if you have a history of heart failure or if you experience any of the following symptoms:  Weight gain of 3 pounds or more overnight or 5 pounds in a week, increased swelling in our hands or feet or shortness of breath while lying flat in bed. Please call your doctor as soon as you notice any of these symptoms; do not wait until your next office visit.

## 2020-08-11 NOTE — PERIOP NOTES
D/C instructions reviewed with pts wife Gregory Innocent at this time. She verbalized understanding.

## 2020-08-11 NOTE — BRIEF OP NOTE
Brief Postoperative Note    Patient: Dany Tinoco  YOB: 1940  MRN: [id number]    Date of Procedure: 8/11/2020     Pre-Op Diagnosis: Ventral incisional hernia [K43.2]    Post-Op Diagnosis: Same as preoperative diagnosis. Procedure(s):  VENTRAL INCISIONAL HERNIA REPAIR WITH MESH  COMPLEX REPAIR (CLOSURE) (15 cm)    Surgeon(s):  Aye Mayes MD    Surgical Assistant: None    Anesthesia: General     Estimated Blood Loss (mL): Minimal    Complications: None    Specimens: * No specimens in log *     Implants:   Implant Name Type Inv. Item Serial No.  Lot No. LRB No. Used Action   phasix ST Mesh 10cm x 15cm      [id number] N/A 1 Implanted       Drains: * No LDAs found *    Findings: Lloyd placed. Cicatrix scar excised. Suprapubic incisional hernia. Heavily scarred sac left intact and  from fascia. 8 cm long defect closed with unidirectional symmetric 0-PDS Stratafix barbed suture which imbricated proximal intact closure. Onlay reimforcement with PhasixST resorbable mesh. 10 x 15 cm mesh cut to 8 x 15 cm. Fixed circumferentially with interrupted 0-PDS sutures. Layered closure of 0-PDS, 0 vicryl, then 3-0 vicryl deep dermal and 4-0 vicryl subcuticular reconstruction (15 cm).     Electronically Signed by Ally Lyon MD on 8/11/2020 at 10:13 AM

## 2020-08-11 NOTE — ANESTHESIA PREPROCEDURE EVALUATION
Relevant Problems   No relevant active problems       Anesthetic History   No history of anesthetic complications            Review of Systems / Medical History  Patient summary reviewed and pertinent labs reviewed    Pulmonary        Sleep apnea           Neuro/Psych   Within defined limits           Cardiovascular                  Exercise tolerance: >4 METS     GI/Hepatic/Renal  Within defined limits              Endo/Other        Arthritis     Other Findings              Physical Exam    Airway  Mallampati: I  TM Distance: 4 - 6 cm  Neck ROM: decreased range of motion   Mouth opening: Normal     Cardiovascular    Rhythm: regular  Rate: normal         Dental  No notable dental hx       Pulmonary  Breath sounds clear to auscultation               Abdominal         Other Findings            Anesthetic Plan    ASA: 2  Anesthesia type: general          Induction: Intravenous  Anesthetic plan and risks discussed with: Patient

## 2020-08-12 NOTE — ANESTHESIA POSTPROCEDURE EVALUATION
Procedure(s): HERNIA VENTRAL REPAIR WITH MESH.     general    Anesthesia Post Evaluation      Multimodal analgesia: multimodal analgesia used between 6 hours prior to anesthesia start to PACU discharge  Patient location during evaluation: PACU  Patient participation: complete - patient participated  Level of consciousness: awake and alert  Pain management: adequate  Airway patency: patent  Anesthetic complications: no  Cardiovascular status: acceptable and hemodynamically stable  Respiratory status: acceptable  Hydration status: acceptable  Post anesthesia nausea and vomiting:  none  Final Post Anesthesia Temperature Assessment:  Normothermia (36.0-37.5 degrees C)      INITIAL Post-op Vital signs:   Vitals Value Taken Time   /78 8/11/2020 10:33 AM   Temp 36.6 °C (97.9 °F) 8/11/2020 10:33 AM   Pulse 85 8/11/2020 10:33 AM   Resp 16 8/11/2020 10:33 AM   SpO2 96 % 8/11/2020 10:33 AM

## 2020-08-17 NOTE — OP NOTES
[address], [address] [facility]  [phone]    OPERATVE REPORT    Name: Shama Castañeda     Date of Surgery: 8/11/2020  Med Record Number: [id number]   Age: [de-identified] y.o. Sex: male   Pre-operative Diagnosis: Ventral incisional hernia [K43.2]  Post-operative Diagnosis: Incarcerated initial ventral incisional hernia  Procedure: INITIAL INCARCERATED VENTRAL INCISIONAL HERNIA REPAIR [cpt 73426],  IMPLANT MESH [cpt +08241],  COMPLEX REPAIR (CLOSURE) (15 cm) [cpt 96636, +26902 x 2]  Surgeon: Dolores Beltran MD  Asistants: none  Anesthesia:  General  Complications: none  Specimen:  * No specimens in log *   Estimated Blood Loss(mL): Minimal   Implants:   Implant Name Type Inv. Item Serial No.  Lot No. LRB No. Used Action   phasix ST Mesh 10cm x 15cm          [id number] N/A 1 Implanted      Drains: * No LDAs found *     Findings: Lloyd placed. Cicatrix scar excised. Suprapubic incisional hernia. Heavily scarred sac left intact and  from fascia. 8 cm long defect closed with unidirectional symmetric 0-PDS Stratafix barbed suture which imbricated proximal intact closure. Onlay reimforcement with PhasixST resorbable mesh. 10 x 15 cm mesh cut to 8 x 15 cm. Fixed circumferentially with interrupted 0-PDS sutures. Layered closure of 0-PDS, 0 vicryl, then 3-0 vicryl deep dermal and 4-0 vicryl subcuticular reconstruction (15 cm). Procedure Description:   The risks, benefits, potential complications, treatment options, and expected outcomes were discussed with the patient pre-operatively. The patient voiced understanding and gave informed consent preoperatively. The patient was taken to the Operating Room, and the [facility] time-out protocol checklist was followed. After the induction of adequate anesthesia, the abdomen was prepped and draped in the usual sterile fashion including use of an Ioban drape to limit contact with the skin.   Preoperative antibiotics were given. Patient had prior lower midline incision which extended from the umbilicus to the pubis from prior open simple prostatectomy. He had most likely disrupted his fascial closure as there was a large defect starting at the pubis. There was no significant bulge while supine. The cicatrix scar was excised to facilitate final closure. Underlying soft tissues were heavily scarred and were carefully divided using a combination of sharp and monopolar electrosurgical energy dissection. The heavily scarred hernia sac was dissected away from the surrounding soft tissue. The hernia originated at the level of the pubis. As dissection continued, it became more clear that the herniated contents was the bladder. This would be especially prominent with a full bladder. His bladder was decompressed with a Lloyd catheter. The hernia sac was quite thick and was not entered to avoid entering the bladder. The defect measured 8 cm and was nearly circular. The superior extent of the closure was intact though attenuated. With the edges completely mobilized the next step was to perform a primary repair using a 0-PDS Stratafix  barbed suture with unidirectional symmetric barbs. Closure was initiated inferior to the pubis on the anterior fascia and then continued superiorly with imbrication of the superior attenuated intact closure. The suture was then redirected inferiorly to lock the suture and to create an initial fixation point for the onlay mesh. The tissues were irrigated with Ancef irrigation. A sheet of 10 x 15 cm BARD PhasixST resorbable mesh was trimmed on each side to obtain an 8 x 15 cm implant. This well overlapped the closure inferiorly and superiorly for reinforcement. The barbed suture in place was used to transfix the mesh for positioning. The mesh was then further fixated using a series of interrupted 0 PDS sutures.   There was adequate mobilization of the lateral flaps to close the soft tissues over the mesh without curling of the mesh medially. The total defect was 15 cm in length. This required a complex repair. A deep closure of this mobilized tissue was performed using 0 PDS suture. A second deep layer of 0 Vicryl was placed to further close the dead space. Interrupted 3-0 Vicryl deep dermal sutures were placed and then finally a 4-0 Vicryl continuous subcuticular closure was used to reconstruct the excised 15 cm defect. No drains were placed. 40 cc of 0.25% bupivacaine with epinephrine had been infiltrated prior to the closure. Dermabond was applied. The wound was dressed sterilely with telfa and tegaderm, All sponge, instruments, and needle counts were correct. The patient was taken to PACU in good condition.     Maryam Conroy MD, FACS

## 2020-09-22 PROBLEM — S30.1XXA ABDOMINAL WALL SEROMA: Status: ACTIVE | Noted: 2020-09-22

## 2020-11-12 ENCOUNTER — APPOINTMENT (RX ONLY)
Dept: URBAN - METROPOLITAN AREA CLINIC 24 | Facility: CLINIC | Age: 80
Setting detail: DERMATOLOGY
End: 2020-11-12

## 2020-11-12 DIAGNOSIS — L91.8 OTHER HYPERTROPHIC DISORDERS OF THE SKIN: ICD-10-CM

## 2020-11-12 DIAGNOSIS — D18.0 HEMANGIOMA: ICD-10-CM

## 2020-11-12 DIAGNOSIS — L57.0 ACTINIC KERATOSIS: ICD-10-CM

## 2020-11-12 DIAGNOSIS — L82.1 OTHER SEBORRHEIC KERATOSIS: ICD-10-CM

## 2020-11-12 DIAGNOSIS — L72.8 OTHER FOLLICULAR CYSTS OF THE SKIN AND SUBCUTANEOUS TISSUE: ICD-10-CM

## 2020-11-12 DIAGNOSIS — L82.0 INFLAMED SEBORRHEIC KERATOSIS: ICD-10-CM

## 2020-11-12 DIAGNOSIS — Z85.828 PERSONAL HISTORY OF OTHER MALIGNANT NEOPLASM OF SKIN: ICD-10-CM

## 2020-11-12 PROBLEM — L70.0 ACNE VULGARIS: Status: ACTIVE | Noted: 2020-11-12

## 2020-11-12 PROBLEM — L55.1 SUNBURN OF SECOND DEGREE: Status: ACTIVE | Noted: 2020-11-12

## 2020-11-12 PROBLEM — D18.01 HEMANGIOMA OF SKIN AND SUBCUTANEOUS TISSUE: Status: ACTIVE | Noted: 2020-11-12

## 2020-11-12 PROBLEM — H91.90 UNSPECIFIED HEARING LOSS, UNSPECIFIED EAR: Status: ACTIVE | Noted: 2020-11-12

## 2020-11-12 PROCEDURE — ? LIQUID NITROGEN

## 2020-11-12 PROCEDURE — ? COUNSELING

## 2020-11-12 PROCEDURE — ? OTHER

## 2020-11-12 ASSESSMENT — LOCATION ZONE DERM
LOCATION ZONE: EAR
LOCATION ZONE: TRUNK
LOCATION ZONE: FACE
LOCATION ZONE: NECK

## 2020-11-12 ASSESSMENT — LOCATION SIMPLE DESCRIPTION DERM
LOCATION SIMPLE: LEFT CHEEK
LOCATION SIMPLE: RIGHT EAR
LOCATION SIMPLE: CHEST
LOCATION SIMPLE: RIGHT UPPER BACK
LOCATION SIMPLE: POSTERIOR NECK
LOCATION SIMPLE: NECK
LOCATION SIMPLE: LEFT UPPER BACK
LOCATION SIMPLE: LOWER BACK
LOCATION SIMPLE: RIGHT ANTERIOR NECK
LOCATION SIMPLE: LEFT ZYGOMA

## 2020-11-12 ASSESSMENT — LOCATION DETAILED DESCRIPTION DERM
LOCATION DETAILED: RIGHT SUPERIOR MEDIAL UPPER BACK
LOCATION DETAILED: LEFT LATERAL ZYGOMA
LOCATION DETAILED: SUPERIOR LUMBAR SPINE
LOCATION DETAILED: LEFT SUPERIOR UPPER BACK
LOCATION DETAILED: RIGHT MEDIAL TRAPEZIAL NECK
LOCATION DETAILED: LEFT MEDIAL INFERIOR CHEST
LOCATION DETAILED: LEFT CENTRAL LATERAL NECK
LOCATION DETAILED: LEFT SUPERIOR CENTRAL MALAR CHEEK
LOCATION DETAILED: RIGHT INFERIOR POSTERIOR HELIX
LOCATION DETAILED: LEFT MEDIAL SUPERIOR CHEST
LOCATION DETAILED: RIGHT CLAVICULAR NECK

## 2020-11-12 NOTE — PROCEDURE: OTHER
Other (Free Text): Gradle removal
Detail Level: Detailed
Note Text (......Xxx Chief Complaint.): This diagnosis correlates with the

## 2020-11-12 NOTE — PROCEDURE: LIQUID NITROGEN
Duration Of Freeze Thaw-Cycle (Seconds): 5-10
Number Of Freeze-Thaw Cycles: 1 freeze-thaw cycle
Medical Necessity Information: It is in your best interest to select a reason for this procedure from the list below. All of these items fulfill various CMS LCD requirements except the new and changing color options.
Render Note In Bullet Format When Appropriate: No
Consent: The patient's consent was obtained including but not limited to risks of crusting, scabbing, blistering, scarring, darker or lighter pigmentary change, recurrence, incomplete removal and infection.
Post-Care Instructions: I reviewed with the patient in detail post-care instructions. Patient is to wear sunprotection, and avoid picking at any of the treated lesions. Pt may apply Vaseline to crusted or scabbing areas.
Detail Level: Detailed
Duration Of Freeze Thaw-Cycle (Seconds): 5

## 2022-03-02 ENCOUNTER — APPOINTMENT (OUTPATIENT)
Dept: PHYSICAL THERAPY | Age: 82
End: 2022-03-02
Attending: UROLOGY

## 2022-03-04 ENCOUNTER — HOSPITAL ENCOUNTER (OUTPATIENT)
Dept: PHYSICAL THERAPY | Age: 82
Discharge: HOME | End: 2022-03-04
Attending: UROLOGY
Payer: MEDICARE

## 2022-03-04 PROCEDURE — 97530 THERAPEUTIC ACTIVITIES: CPT

## 2022-03-04 PROCEDURE — 97162 PT EVAL MOD COMPLEX 30 MIN: CPT

## 2022-03-04 PROCEDURE — 97110 THERAPEUTIC EXERCISES: CPT

## 2022-03-04 NOTE — PROGRESS NOTES
Junior Bai  : 1940  Primary: [place] Medicare Hmo  Secondary:  [facility] [place] [id number] [facility], [address] [place] [address],[address], [address]  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 Frequency/Duration:     Pain: Initial: Pain Intensity 1: 0  Post Session:  0/10   Medications Last Reviewed:  3/4/2022  Updated Objective Findings:  See evaluation note from today   TREATMENT:   THERAPEUTIC EXERCISE: (0 minutes):  Exercises per grid below to improve strength and coordination. Required moderate verbal cues to promote proper body mechanics. Progressed repetitions as indicated. Date:  3/4/22 Date:   Date:     Activity/Exercise Parameters Parameters Parameters   Kegel Exercises Supine, seated, standing 24X/69B, quick flicks; 3 x 5 reps 3x/day                                      THERAPEUTIC ACTIVITY: ( * minutes): Functional activity education regarding anatomy, pathology and role of pelvic floor muscle (PFM) function in relation to presenting symptoms and role of pelvic floor therapy in conservative treatment.   TA Educational Topic Notes Date Completed   Pathology/Anatomy/PFM Function     Bladder health education     Urinary urge suppression     The knack     Voiding strategies     Bowel/Bladder log     Bowel health education     Constipation care     Diarrhea/Fecal leakage     Colonic massage     Toilet positioning     Defecation dynamics     Sources of fiber     Return to intercourse/Dilator training     Sexual positioning     Lubricants/vaginal moisturizers     Vaginal irritants     Body mechanics Posture/ergonomics     Diaphragmatic breathing     Resources and technology       Treatment/Session Summary:    · Response to Treatment:  Pt reports good understanding of plan of care, as well as prescribed home exercise program.  All questions were answered to pt's satisfaction. Pt was invited to call with any further questions or concerns. · Communication/Consultation:  None today  · Equipment provided today:  None today  · Recommendations/Intent for next treatment session: Next visit will focus on bladder health, review HEP with sEMG biofeedback. Functional use of PFM instruction.   Total Treatment Billable Duration: Evaluation 40 minutes, treatment 20 minutes  PT Patient Time In/Time Out  Time In: 0900  Time Out: [facility], PT     Future Appointments   Date Time Provider Ashley Ha   1/24/2023  9:00 AM [id number] BLOOD DRAW [id number] PGU   1/31/2023 10:15 AM Alexus Villagran, DO [id number] PGU

## 2022-03-10 ENCOUNTER — HOSPITAL ENCOUNTER (OUTPATIENT)
Dept: PHYSICAL THERAPY | Age: 82
Discharge: HOME | End: 2022-03-10
Attending: UROLOGY
Payer: MEDICARE

## 2022-03-10 PROCEDURE — 97530 THERAPEUTIC ACTIVITIES: CPT

## 2022-03-10 PROCEDURE — 97110 THERAPEUTIC EXERCISES: CPT

## 2022-03-10 NOTE — PROGRESS NOTES
Letitia Adams  : 1940  Primary: Ruben Evans Medicare o  Secondary:  [address]  [facility] 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 Frequency/Duration:     Pt feels his pad is less wet. Pain: Initial: Pain Intensity 1: 0 0/10 Post Session:  0/10   Medications Last Reviewed:  3/10/2022     Updated Objective Findings:   sEMG biofeedback: resting PFM activity at <1.0 mV. Max activation to 20 mV. Endurance <6 seconds. TREATMENT:   THERAPEUTIC EXERCISE: (35 minutes):  Exercises per grid below to improve strength and coordination. Required moderate verbal cues to promote proper body mechanics. Progressed repetitions as indicated. Date:  3/4/22 Date:  3/10/22 Date:     Activity/Exercise Parameters Parameters Parameters   Kegel Exercises Supine, seated, standing 46G/89J, quick flicks; 3 x 5 reps 3x/day Completed with sEMG biofeedback in side-lying  5 sec holds  10 sec holds  Quick flicks    Single leg balance, tandem balance for assisted hip strengthening and stability  10 minutes                               THERAPEUTIC ACTIVITY: (10 minutes): Functional activity education regarding anatomy, pathology and role of pelvic floor muscle (PFM) function in relation to presenting symptoms and role of pelvic floor therapy in conservative treatment.      TA Educational Topic Notes Date Completed   Pathology/Anatomy/PFM Function Reviewed/completed 3/10/22   Bladder health education     Urinary urge suppression     The knack Instructed in rationale 3/10/22 Voiding strategies     Bowel/Bladder log     Bowel health education     Constipation care     Diarrhea/Fecal leakage     Colonic massage     Toilet positioning     Defecation dynamics     Sources of fiber     Return to intercourse/Dilator training     Sexual positioning     Lubricants/vaginal moisturizers     Vaginal irritants     Body mechanics     Posture/ergonomics     Diaphragmatic breathing     Resources and technology       Treatment/Session Summary:    · Response to Treatment: Pt shows good isolation and coordination of PFM contraction as assessed with sEMG biofeedback. Shows limitations in PFM endurance and balance. Pt verbalizes understanding of HEP and has been diligent thus far with completing his home exercises. · Communication/Consultation:  None today  · Equipment provided today:  None today  · Recommendations/Intent for next treatment session: Next visit will focus on bladder health, review HEP with sEMG biofeedback. Functional use of PFM instruction.   Total Treatment Billable Duration: Treatment 45 minutes  PT Patient Time In/Time Out  Time In: 8754  Time Out: [address], PT     Future Appointments   Date Time Provider Ashley Ha   3/16/2022 11:00 AM Andrés Chiang, PT [facility]   3/23/2022  9:00 AM Andrés Chiang, PT [facility] SFE   3/30/2022  9:00 AM Andrés Chiang, PT [facility]ORPG[facility]   1/24/2023  9:00 AM [id number] BLOOD DRAW [id number] PGU   1/31/2023 10:15 AM Alexus Villagran,  [id number] PGU

## 2022-03-16 ENCOUNTER — HOSPITAL ENCOUNTER (OUTPATIENT)
Dept: PHYSICAL THERAPY | Age: 82
Discharge: HOME | End: 2022-03-16
Attending: UROLOGY
Payer: MEDICARE

## 2022-03-16 PROCEDURE — 97530 THERAPEUTIC ACTIVITIES: CPT

## 2022-03-16 PROCEDURE — 97110 THERAPEUTIC EXERCISES: CPT

## 2022-03-16 NOTE — PROGRESS NOTES
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 Frequency/Duration:     Subjective:   Pt states his pads are less damp/wet. Pain: Initial: Pain Intensity 1: 0 0/10 Post Session:  0/10   Medications Last Reviewed:  3/16/2022     Updated Objective Findings:   Pads less damp per patient report  PFM endurance: 5 sec     TREATMENT:   THERAPEUTIC EXERCISE: (30 minutes):  Exercises per grid below to improve strength and coordination. Required moderate verbal cues to promote proper body mechanics. Progressed repetitions as indicated. Date:  3/4/22 Date:  3/10/22 Date:  3/16/22   Activity/Exercise Parameters Parameters Parameters   Kegel Exercises Supine, seated, standing 75A/49R, quick flicks; 3 x 5 reps 3x/day Completed with sEMG biofeedback in side-lying  5 sec holds  10 sec holds  Quick flicks Seated on exercise ball x 10 x 5 sec hold QF x 1 min    Standing x 10 x 5 sec hold, QF x 1 min   Single leg balance, tandem balance for assisted hip strengthening and stability  10 minutes Single leg, toe down - unilateral UE support   Hip hinge at counter   Hamstring and lumbar mobility                        THERAPEUTIC ACTIVITY: (25 minutes): Functional activity education regarding anatomy, pathology and role of pelvic floor muscle (PFM) function in relation to presenting symptoms and role of pelvic floor therapy in conservative treatment.      TA Educational Topic Notes Date Completed   Pathology/Anatomy/PFM Function Reviewed/completed  Reviewed - rationale for exercises 3/10/22  3/16/22   Bladder health education     Urinary urge suppression     The guerda Instructed in rationale 3/10/22   Voiding strategies     Bowel/Bladder log     Bowel health education     Constipation care     Diarrhea/Fecal leakage     Colonic massage     Toilet positioning     Defecation dynamics     Sources of fiber     Return to intercourse/Dilator training     Sexual positioning     Lubricants/vaginal moisturizers     Vaginal irritants     Body mechanics     Posture/ergonomics Postural education - seated, standing for efficient use of PFM. Pt with tendency to sway posteriorly 3/16/22   Diaphragmatic breathing     Resources and technology       Treatment/Session Summary:    · Response to Treatment: Pt will continue to benefit from verbal cues for appropriate standing and seated posture with performance of kegals. 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 · Communication/Consultation:  None today  · Equipment provided today:  None today  · Recommendations/Intent for next treatment session: Next visit will focus on bladder health, review HEP with sEMG biofeedback. Functional use of PFM instruction.   Total Treatment Billable Duration: Treatment 55 minutes  PT Patient Time In/Time Out  Time In: 1100  Time Out: [address], PT     Future Appointments   Date Time Provider Ashley Ha   3/23/2022  9:00 AM Akin Borges, PT [facility] SFE   3/30/2022  9:00 AM Akin Borges PT [facility] SFE   1/24/2023  9:00 AM [id number] BLOOD DRAW [id number] PGU   1/31/2023 10:15 AM Deedee Villagran, DO [id number] PGU

## 2022-03-19 PROBLEM — Z90.79: Status: ACTIVE | Noted: 2020-06-29

## 2022-03-19 PROBLEM — E53.8 B12 DEFICIENCY: Status: ACTIVE | Noted: 2018-01-05

## 2022-03-19 PROBLEM — G62.9 AXONAL POLYNEUROPATHY: Status: ACTIVE | Noted: 2018-01-15

## 2022-03-19 PROBLEM — S30.1XXA ABDOMINAL WALL SEROMA: Status: ACTIVE | Noted: 2020-09-22

## 2022-03-19 PROBLEM — N40.0 BPH (BENIGN PROSTATIC HYPERPLASIA): Status: ACTIVE | Noted: 2019-10-03

## 2022-03-19 PROBLEM — N40.0 BENIGN PROSTATIC HYPERPLASIA WITHOUT LOWER URINARY TRACT SYMPTOMS: Status: ACTIVE | Noted: 2017-12-14

## 2022-03-20 PROBLEM — H53.2 DIPLOPIA: Status: ACTIVE | Noted: 2020-01-28

## 2022-03-20 PROBLEM — K43.2 INCISIONAL HERNIA OF ANTERIOR ABDOMINAL WALL WITHOUT OBSTRUCTION OR GANGRENE: Status: ACTIVE | Noted: 2020-06-29

## 2022-03-20 PROBLEM — R41.3 MEMORY DIFFICULTY: Status: ACTIVE | Noted: 2020-01-28

## 2022-03-23 ENCOUNTER — HOSPITAL ENCOUNTER (OUTPATIENT)
Dept: PHYSICAL THERAPY | Age: 82
Discharge: HOME | End: 2022-03-23
Attending: UROLOGY
Payer: MEDICARE

## 2022-03-23 NOTE — THERAPY DISCHARGE
Brooke Virk  : 1940  Primary: Luana Evans Medicare o  Secondary:  [address] at [place] [id number] [facility], [address] [place] [address],[address]  [phone]   [phone]        OUTPATIENT PHYSICAL THERAPY:Discharge Summary 3/23/2022    ICD-10: Treatment Diagnosis: Lack of coordination (muscle incoordination) (R27.8), Pelvic Muscle Wasting (N81.84) and Stress incontinence (female) (male) (N39.3)  Precautions/Allergies:   Multihance [gadobenate dimeglumine]   TREATMENT PLAN:  Effective Dates: 3/4/2022 TO 5/3/2022 (60 days). Frequency/Duration:  MEDICAL/REFERRING DIAGNOSIS:  Stress incontinence (female) (male) [N39.3]   DATE OF ONSET:  REFERRING PHYSICIAN: Liz Villagran DO  MD Orders: evaluate and treat  Return MD Appointment:        DISCHARGE SUMMARY: Mr. Clara Ramos has been seen in skilled PT from 3/4/22 to 3/23/22. Patient has attended 3 out of 5 scheduled visits, with 2 cancellation(s) and 0 no shows. Treatment has emphasized bladder health, PFM training, body mechanics training, postural education, and balance training. Patient responded well to therapy, with improvements in urinary leakage, reporting a reduction in the amount of urine leaked and use of pads. He called to request discharging from PFPT prior to the end of his plan of care due to the progress he had made thus far. He did not return for this final session. Pt was invited to call with any further questions or concerns as needed. INITIAL ASSESSMENT:  Mr. Clara Ramos presents with decreased coordination, strength and endurance of pelvic floor muscles. Today he was educated on bladder health, kegel exercises, pelvic floor anatomy and role of musculature. He required verbal and visual cuing for proper activation and isolation of pelvic floor muscles. He was able to contract muscle for 10 seconds with mild breath holding and accessory muscle use of gluteals and adductors.  He was given kegel exercises to be performed at home  He was educated on and precautions with kegel exercises. He was able to demonstrate improved coordination by the end of the session today. He will continue to benefit from skilled physical therapy to address above mentioned deficits and restore normal PFM function post operatively to minimize urinary incontinence.                    Kentrell Nelson, PT, DPT

## 2022-03-30 ENCOUNTER — APPOINTMENT (OUTPATIENT)
Dept: PHYSICAL THERAPY | Age: 82
End: 2022-03-30
Attending: UROLOGY
Payer: MEDICARE

## 2023-01-09 ENCOUNTER — TELEPHONE (OUTPATIENT)
Dept: ORTHOPEDIC SURGERY | Age: 83
End: 2023-01-09

## 2023-01-09 NOTE — TELEPHONE ENCOUNTER
Pt fell getting out of shower, he has bladder infection and is not stable, they went to   Urgent care and took x-rays no fx, but wife  And are concerned and wants to see Reanna Berger

## 2023-01-10 ENCOUNTER — TELEPHONE (OUTPATIENT)
Dept: ORTHOPEDIC SURGERY | Age: 83
End: 2023-01-10

## 2023-01-17 ENCOUNTER — OFFICE VISIT (OUTPATIENT)
Dept: ORTHOPEDIC SURGERY | Age: 83
End: 2023-01-17

## 2023-01-17 VITALS — HEIGHT: 72 IN | BODY MASS INDEX: 26.55 KG/M2 | WEIGHT: 196 LBS

## 2023-01-17 DIAGNOSIS — Z96.641 HISTORY OF TOTAL HIP ARTHROPLASTY, RIGHT: ICD-10-CM

## 2023-01-17 DIAGNOSIS — M25.551 RIGHT HIP PAIN: Primary | ICD-10-CM

## 2023-01-17 NOTE — PROGRESS NOTES
Name: Salena Choe  YOB: 1940  Gender: male  MRN: [id number]    No current outpatient medications on file. Allergies   Allergen Reactions    Gadobenate Hives       CC: Post op right PHILLIP    HPI: Patient is here now postop total hip replacement in 2007.  2 weeks ago patient fell backwards onto his back in the shower. Since then he has had a sharp aching pain over the lateral hip with lifting the leg, stairs, and rising from sit to stand. He denies any groin pain. He denies any instability, radiation of pain, numbness or tingling down the leg. He has been treating with Tylenol and ibuprofen with some improvement. Here for evaluation of this. No other new complaints. PMH: Reviewed in chart    ROS:  As per HPI. Pertinent positives and negatives are addressed with the patient, particularly those related to musculoskeletal concerns. Non-orthopaedic concerns were referred back to the primary care physician. PE:  right hip  Patient is comfortable and in no distress. Flexion: 0 to 90 degrees  Internal rotation: 20 degrees  External rotation: 25 degrees  Adduction: 20 degrees  Abduction: 20 degrees  Incision:  well healed  Gait: no trendelenburg or antalgia  No instability with ROM  Limb length is equal  Quadriceps strength is good    Radiographs:  AP pelvis and lateral views of the right taken in the office reveal a good bone prosthetic interface with no suggestion of a progressive lucency. The patient does have a normal displaced fracture of his right greater trochanter. This is a Roll type IA fracture. Radiographic Diagnosis: The patient has a type IA prosthetic fracture right femur. Recommendations:  Reviewed x-ray findings with the patient. Patient has a periprosthetic fracture right femur. This does not need surgery. I recommended the use of a walker for at least 4 weeks.   He will return in 4 weeks with repeat x-rays of the right hip AP and lateral.  We may progress into a cane at that point in time if his symptoms have improved.

## 2023-02-14 ENCOUNTER — OFFICE VISIT (OUTPATIENT)
Dept: ORTHOPEDIC SURGERY | Age: 83
End: 2023-02-14
Payer: MEDICARE

## 2023-02-14 DIAGNOSIS — Z96.649 PERI-PROSTHETIC FRACTURE OF FEMUR FOLLOWING TOTAL HIP ARTHROPLASTY, SUBSEQUENT ENCOUNTER: ICD-10-CM

## 2023-02-14 DIAGNOSIS — Z96.641 HISTORY OF TOTAL HIP ARTHROPLASTY, RIGHT: Primary | ICD-10-CM

## 2023-02-14 DIAGNOSIS — M97.8XXD PERI-PROSTHETIC FRACTURE OF FEMUR FOLLOWING TOTAL HIP ARTHROPLASTY, SUBSEQUENT ENCOUNTER: ICD-10-CM

## 2023-02-14 PROCEDURE — 1036F TOBACCO NON-USER: CPT | Performed by: ORTHOPAEDIC SURGERY

## 2023-02-14 PROCEDURE — G8484 FLU IMMUNIZE NO ADMIN: HCPCS | Performed by: ORTHOPAEDIC SURGERY

## 2023-02-14 PROCEDURE — 1123F ACP DISCUSS/DSCN MKR DOCD: CPT | Performed by: ORTHOPAEDIC SURGERY

## 2023-02-14 PROCEDURE — G8417 CALC BMI ABV UP PARAM F/U: HCPCS | Performed by: ORTHOPAEDIC SURGERY

## 2023-02-14 PROCEDURE — G8428 CUR MEDS NOT DOCUMENT: HCPCS | Performed by: ORTHOPAEDIC SURGERY

## 2023-02-14 PROCEDURE — 99213 OFFICE O/P EST LOW 20 MIN: CPT | Performed by: ORTHOPAEDIC SURGERY

## 2023-02-14 NOTE — PROGRESS NOTES
Name: Jeannie Danielle  YOB: 1940  Gender: male  MRN: [id number]    CC: Hip Pain (Jazz rt daniela)       HPI: Jeannie Danielle is a 79 y.o. male who presents with Hip Pain (Jazz rt daniela)  The patient returns today for follow-up of right periprosthetic fracture. He is having very little discomfort and pain but feels more comfortable walking with a walker. History was obtained by patient    ROS/Meds/PSH/PMH/FH/SH: I personally reviewed the patients standard intake form. No current outpatient medications on file. 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 Slight tenderness over the right greater trochanter. Minimal pain with range of motion of the right hip. Minimal pain with ambulation. Calf is soft nontender. Neurovascular exam normal    Imaging:   AP pelvis and lateral right hip reveals periprosthetic fracture right femur involving primarily the right greater trochanter. There is been no interval displacement. Very little callus formation is noted. Prosthesis is stable. X-ray diagnosis: Stable appearance of the periprosthetic fracture right femur    Assessment:   Patient is doing well with regard to the right hip. His pain is improving. There is been no displacement of the fracture. Very little callus is noted. He will continue ambulating with a walker.   He will return here in 6 weeks for follow-up. He will need repeat x-rays of the right hip on return.   3--this is a stable chronic illness/condition    Plan:   The patient will return here in 6 weeks for follow-up

## 2023-03-28 ENCOUNTER — OFFICE VISIT (OUTPATIENT)
Dept: ORTHOPEDIC SURGERY | Age: 83
End: 2023-03-28
Payer: MEDICARE

## 2023-03-28 DIAGNOSIS — Z96.649 PERI-PROSTHETIC FRACTURE OF FEMUR FOLLOWING TOTAL HIP ARTHROPLASTY, SUBSEQUENT ENCOUNTER: Primary | ICD-10-CM

## 2023-03-28 DIAGNOSIS — M97.8XXD PERI-PROSTHETIC FRACTURE OF FEMUR FOLLOWING TOTAL HIP ARTHROPLASTY, SUBSEQUENT ENCOUNTER: Primary | ICD-10-CM

## 2023-03-28 DIAGNOSIS — Z96.641 HISTORY OF TOTAL HIP ARTHROPLASTY, RIGHT: ICD-10-CM

## 2023-03-28 PROCEDURE — G8417 CALC BMI ABV UP PARAM F/U: HCPCS | Performed by: ORTHOPAEDIC SURGERY

## 2023-03-28 PROCEDURE — G8484 FLU IMMUNIZE NO ADMIN: HCPCS | Performed by: ORTHOPAEDIC SURGERY

## 2023-03-28 PROCEDURE — 1036F TOBACCO NON-USER: CPT | Performed by: ORTHOPAEDIC SURGERY

## 2023-03-28 PROCEDURE — 99213 OFFICE O/P EST LOW 20 MIN: CPT | Performed by: ORTHOPAEDIC SURGERY

## 2023-03-28 PROCEDURE — 1123F ACP DISCUSS/DSCN MKR DOCD: CPT | Performed by: ORTHOPAEDIC SURGERY

## 2023-03-28 PROCEDURE — G8428 CUR MEDS NOT DOCUMENT: HCPCS | Performed by: ORTHOPAEDIC SURGERY

## 2023-03-28 NOTE — PROGRESS NOTES
callus present. X-ray diagnosis: Healing periprosthetic fracture right femur. Assessment:   Jose Carlos Porras is doing better. Patient may progress activities. He will be referred to outpatient physical therapy for strengthening of the right hip in approximately 1 to 2 weeks. He is to return here in 3 months 3 months for follow-up.   3--this is a stable chronic illness/condition    Plan:   3 weeks

## 2023-03-29 ENCOUNTER — TELEPHONE (OUTPATIENT)
Dept: ORTHOPEDIC SURGERY | Age: 83
End: 2023-03-29

## 2023-03-29 NOTE — TELEPHONE ENCOUNTER
[facility] charges 40 per visit and wants to know of anymore PT facilities in  that might be cheaper?  Call# [phone]

## 2023-03-29 NOTE — TELEPHONE ENCOUNTER
Called pt and let him know that the $40 charge was his copay for PT and would most likely be expected wherever he decides to go. He chose to stay at the [facility].

## 2023-04-21 DIAGNOSIS — C61 MALIGNANT NEOPLASM OF PROSTATE (HCC): Primary | ICD-10-CM

## 2023-04-25 ENCOUNTER — NURSE ONLY (OUTPATIENT)
Dept: UROLOGY | Age: 83
End: 2023-04-25

## 2023-04-25 DIAGNOSIS — C61 MALIGNANT NEOPLASM OF PROSTATE (HCC): ICD-10-CM

## 2023-04-26 LAB — PSA SERPL DL<=0.01 NG/ML-MCNC: 4.76 NG/ML (ref 0–4)

## 2023-05-02 ENCOUNTER — OFFICE VISIT (OUTPATIENT)
Dept: UROLOGY | Age: 83
End: 2023-05-02
Payer: MEDICARE

## 2023-05-02 DIAGNOSIS — C61 MALIGNANT NEOPLASM OF PROSTATE (HCC): Primary | ICD-10-CM

## 2023-05-02 DIAGNOSIS — N40.1 BENIGN PROSTATIC HYPERPLASIA WITH LOWER URINARY TRACT SYMPTOMS, SYMPTOM DETAILS UNSPECIFIED: ICD-10-CM

## 2023-05-02 LAB
BILIRUBIN, URINE, POC: NEGATIVE
BLOOD URINE, POC: NORMAL
GLUCOSE URINE, POC: NEGATIVE
KETONES, URINE, POC: NEGATIVE
LEUKOCYTE ESTERASE, URINE, POC: NORMAL
NITRITE, URINE, POC: POSITIVE
PH, URINE, POC: 5.5 (ref 4.6–8)
PROTEIN,URINE, POC: NORMAL
SPECIFIC GRAVITY, URINE, POC: 1.02 (ref 1–1.03)
URINALYSIS CLARITY, POC: NORMAL
URINALYSIS COLOR, POC: NORMAL
UROBILINOGEN, POC: NORMAL

## 2023-05-02 PROCEDURE — 81003 URINALYSIS AUTO W/O SCOPE: CPT | Performed by: UROLOGY

## 2023-05-02 PROCEDURE — 99214 OFFICE O/P EST MOD 30 MIN: CPT | Performed by: UROLOGY

## 2023-05-02 PROCEDURE — 1123F ACP DISCUSS/DSCN MKR DOCD: CPT | Performed by: UROLOGY

## 2023-05-02 PROCEDURE — 1036F TOBACCO NON-USER: CPT | Performed by: UROLOGY

## 2023-05-02 PROCEDURE — G8417 CALC BMI ABV UP PARAM F/U: HCPCS | Performed by: UROLOGY

## 2023-05-02 PROCEDURE — G8427 DOCREV CUR MEDS BY ELIG CLIN: HCPCS | Performed by: UROLOGY

## 2023-05-02 NOTE — PROGRESS NOTES
Number of children: Not on file    Years of education: Not on file    Highest education level: Not on file   Occupational History    Not on file   Tobacco Use    Smoking status: Never    Smokeless tobacco: Former     Quit date: 1/1/2019   Substance and Sexual Activity    Alcohol use: Not Currently    Drug use: No    Sexual activity: Not on file   Other Topics Concern    Not on file   Social History Narrative    Not on file     Social Determinants of Health     Financial Resource Strain: Not on file   Food Insecurity: Not on file   Transportation Needs: Not on file   Physical Activity: Not on file   Stress: Not on file   Social Connections: Not on file   Intimate Partner Violence: Not on file   Housing Stability: Not on file     Family History   Problem Relation Age of Onset    Psychiatric Disorder Mother     Suicide Mother 61    Other Sister         major head injury        Review of Systems  All systems reviewed and are negative at this time. Physical Exam  There were no vitals taken for this visit.   General appearance - alert, well appearing, and in no distress  Mental status - alert, oriented to person, place, and time  Eyes - extraocular eye movements intact, sclera anicteric  Abdomen - soft, nontender, nondistended, no masses or organomegaly  Neurological -  normal speech, no focal findings or movement disorder noted  Skin - normal coloration and turgor      Urinalysis  UA - Dipstick  Results for orders placed or performed in visit on 05/02/23   AMB POC URINALYSIS DIP STICK AUTO W/O MICRO   Result Value Ref Range    Color (UA POC)      Clarity (UA POC)      Glucose, Urine, POC Negative Negative    Bilirubin, Urine, POC Negative Negative    KETONES, Urine, POC Negative Negative    Specific Gravity, Urine, POC 1.020 1.001 - 1.035    Blood (UA POC) Trace Negative    pH, Urine, POC 5.5 4.6 - 8.0    Protein, Urine, POC Trace Negative    Urobilinogen, POC 0.2 mg/dL     Nitrite, Urine, POC Positive Negative

## 2023-05-04 ENCOUNTER — TELEPHONE (OUTPATIENT)
Dept: UROLOGY | Age: 83
End: 2023-05-04

## 2023-05-04 LAB
BACTERIA SPEC CULT: ABNORMAL
BACTERIA SPEC CULT: ABNORMAL
SERVICE CMNT-IMP: ABNORMAL

## 2023-05-04 RX ORDER — SULFAMETHOXAZOLE AND TRIMETHOPRIM 800; 160 MG/1; MG/1
1 TABLET ORAL 2 TIMES DAILY
Qty: 14 TABLET | Refills: 0 | Status: SHIPPED | OUTPATIENT
Start: 2023-05-04 | End: 2023-05-11

## 2023-09-21 ENCOUNTER — TELEPHONE (OUTPATIENT)
Dept: UROLOGY | Age: 83
End: 2023-09-21

## 2023-09-21 NOTE — TELEPHONE ENCOUNTER
Patient recently went to PCP and psa was drawn, came back at 5.5. PCP asked patient to follow up with you and see if you need to see him to assess.

## 2024-04-30 ENCOUNTER — NURSE ONLY (OUTPATIENT)
Dept: UROLOGY | Age: 84
End: 2024-04-30

## 2024-04-30 DIAGNOSIS — C61 MALIGNANT NEOPLASM OF PROSTATE (HCC): ICD-10-CM

## 2024-04-30 LAB — PSA SERPL-MCNC: 4.8 NG/ML (ref 0–4)

## 2024-05-07 ENCOUNTER — HOSPITAL ENCOUNTER (OUTPATIENT)
Dept: ULTRASOUND IMAGING | Age: 84
Discharge: HOME | End: 2024-05-10
Attending: UROLOGY
Payer: MEDICARE

## 2024-05-07 ENCOUNTER — OFFICE VISIT (OUTPATIENT)
Dept: UROLOGY | Age: 84
End: 2024-05-07
Payer: MEDICARE

## 2024-05-07 DIAGNOSIS — C61 MALIGNANT NEOPLASM OF PROSTATE (HCC): Primary | ICD-10-CM

## 2024-05-07 DIAGNOSIS — N40.1 BENIGN PROSTATIC HYPERPLASIA WITH LOWER URINARY TRACT SYMPTOMS, SYMPTOM DETAILS UNSPECIFIED: ICD-10-CM

## 2024-05-07 DIAGNOSIS — R60.0 LOWER EXTREMITY EDEMA: ICD-10-CM

## 2024-05-07 LAB
BILIRUBIN, URINE, POC: NEGATIVE
BLOOD URINE, POC: NEGATIVE
GLUCOSE URINE, POC: NEGATIVE
KETONES, URINE, POC: NEGATIVE
LEUKOCYTE ESTERASE, URINE, POC: NORMAL
NITRITE, URINE, POC: NEGATIVE
PH, URINE, POC: 5.5 (ref 4.6–8)
PROTEIN,URINE, POC: NEGATIVE
SPECIFIC GRAVITY, URINE, POC: 1.01 (ref 1–1.03)
URINALYSIS CLARITY, POC: NORMAL
URINALYSIS COLOR, POC: NORMAL
UROBILINOGEN, POC: NORMAL

## 2024-05-07 PROCEDURE — 99214 OFFICE O/P EST MOD 30 MIN: CPT | Performed by: UROLOGY

## 2024-05-07 PROCEDURE — 81003 URINALYSIS AUTO W/O SCOPE: CPT | Performed by: UROLOGY

## 2024-05-07 PROCEDURE — G8421 BMI NOT CALCULATED: HCPCS | Performed by: UROLOGY

## 2024-05-07 PROCEDURE — G8427 DOCREV CUR MEDS BY ELIG CLIN: HCPCS | Performed by: UROLOGY

## 2024-05-07 PROCEDURE — 93971 EXTREMITY STUDY: CPT

## 2024-05-07 PROCEDURE — 1123F ACP DISCUSS/DSCN MKR DOCD: CPT | Performed by: UROLOGY

## 2024-05-07 PROCEDURE — 93971 EXTREMITY STUDY: CPT | Performed by: RADIOLOGY

## 2024-05-07 PROCEDURE — 1036F TOBACCO NON-USER: CPT | Performed by: UROLOGY

## 2024-05-07 RX ORDER — DONEPEZIL HYDROCHLORIDE 5 MG/1
5 TABLET, FILM COATED ORAL NIGHTLY
COMMUNITY

## 2024-05-07 NOTE — PROGRESS NOTES
[facility]  [address] [place] [address]  [phone]    Alfredo Serrato  : 1940     HPI   83 y.o., male returns in follow up for BPH/CaP.  S/P open simple SP prostatectomy on 10/2/19.  Voiding well but does report a mild but persistent dribble of urine requiring a pad daily.  PVR post op was 40cc.  Path showed [place] 6 CaP in his adenoma (<1% involvement).  Last PSA prior to prostatectomy was 5.6 on 2017.  PSA was 1.7 on 20; 2.2 on 20; 3.3 on 22; 4.76 on 23 and is now 4.8 on 24.  Previous neg TRUS bx on 2012.  Prebx PSA was 7.47.  Reports progressive ED.  No reports no response with sildenafil.  Has rx for trimix but never filled.  Not interested in ED tx at this time.  VHR performed by Dr. Worley on 20.  No new complaints other than 2-3 day history of RLE swelling.      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    BPH (benign prostatic hypertrophy) 2013    Colon polyps     Diplopia 2020    ED (erectile dysfunction)     Elevated prostate specific antigen (PSA)     Hiatal hernia     HTN (hypertension) 2013 Pt states never prescribed medication    Impotence of organic origin     Leukoplakia of lips     Memory difficulty 2020    Ocular migraine     x 1    Peripheral neuropathy     Sleep apnea     borderline, no cpap    Spinal stenosis     Ventral hernia     Vertigo 2013     Past Surgical History:   Procedure Laterality Date    CHOLECYSTECTOMY, LAPAROSCOPIC  2007    HERNIA REPAIR Left     left inguinal    HERNIA REPAIR Right     inguinal    ROTATOR CUFF REPAIR Right 2013    TONSILLECTOMY  childhood    TOTAL HIP ARTHROPLASTY Right      Current Outpatient Medications   Medication Sig Dispense Refill    donepezil (ARICEPT) 5 MG tablet Take 1 tablet by mouth nightly       No current facility-administered medications for this visit.

## 2025-02-27 NOTE — THERAPY EVALUATION
Isabel Wray  : 1940  Primary: Lucila Evans Medicare Hmo  Secondary:  [address] at [place] [id number] [facility], [address] [place] [address],[address], [facility] [address]  [phone]   [phone]        David 52 Assessment 3/4/2022    ICD-10: Treatment Diagnosis: Lack of coordination (muscle incoordination) (R27.8), Pelvic Muscle Wasting (N81.84) and Stress incontinence (female) (male) (N39.3)  Precautions/Allergies:   Multihance [gadobenate dimeglumine]   TREATMENT PLAN:  Effective Dates: 3/4/2022 TO 5/3/2022 (60 days). Frequency/Duration:  MEDICAL/REFERRING DIAGNOSIS:  No admission diagnoses are documented for this encounter. DATE OF ONSET:  REFERRING PHYSICIAN: Jean-Pierre Villagran DO  MD Orders: evaluate and treat  Return MD Appointment:        INITIAL ASSESSMENT:  Mr. Bruna Rosado presents with decreased coordination, strength and endurance of pelvic floor muscles. Today he was educated on bladder health, kegel exercises, pelvic floor anatomy and role of musculature. He required verbal and visual cuing for proper activation and isolation of pelvic floor muscles. He was able to contract muscle for 10 seconds with mild breath holding and accessory muscle use of gluteals and adductors. He was given kegel exercises to be performed at home  He was educated on and precautions with kegel exercises. He was able to demonstrate improved coordination by the end of the session today. He will continue to benefit from skilled physical therapy to address above mentioned deficits and restore normal PFM function post operatively to minimize urinary incontinence. PROBLEM LIST (Impacting functional limitations):  1. Decreased Strength  2. Decreased Activity Tolerance  3. Decreased [place] with Home Exercise Program  4. Decreased strength of pelvic floor which limits bladder control INTERVENTIONS PLANNED:  1. Neuromuscular re-education  2.  Biofeedback as Patient fell about 2 hours ago outside and injured his right elbow, patient states that he has broken it before.   needed  3. HEP  4. Bladder retraining  5. Bladder education  6. Manual Therapy  7. Therapeutic Activites  8. Therapeutic Exercise/Strengthening     GOALS: (Goals have been discussed and agreed upon with patient.)  Short-Term Functional Goals: Time Frame: 4 weeks  1. Patient will demonstrate I with basic PFM HEP to improve awareness, coordination, and timing of PFM. 2. Patient will verbalize an understanding of pelvic anatomy and causes of post op incontinence. 3. Patient will demonstrate understanding of and ability to teach back appropriate water intake, bladder irritants, toileting frequency, and positioning for improved self-management of symptoms. Discharge Goals:   1. Pt will demonstrate appropriate co-contraction of the transversus abdominus and pelvic floor muscle group, and maintain with lifting, bending, and transitioning to improve core stability and minimize ERLINDA with lifting, pushing/pulling tasks and transitional movements. 2. Pt will report decreased pad usage to 0 PPD. 3. Patient will demonstrate ability to voluntarily contract the pelvic floor muscles prior to a cough or sneeze for decreased leakage during increases in intra-abdominal pressure. 4. Patient will improve outcome to 0% impairment. 5. Patient will demonstrate independence with an advanced HEP for general conditioning, core stabilization, and mobility to facilitate carry over and independent management of symptoms. Outcome Measure:   NIH:   Pain - 0  Urinary symptoms - 1  QOL symptoms - 3    Medical Necessity:   · Patient is expected to demonstrate progress in strength, coordination and functional technique to minimize UI. Reason for Services/Other Comments:  · Patient continues to require skilled intervention due to above mentioned deficits.      Total Duration: 60 minutes        Rehabilitation Potential For Stated Goals: Good  Regarding Gabriel Jack's therapy, I certify that the treatment plan above will be carried out by a therapist or under their direction. Thank you for this referral,  Jerry Anderson PT     Referring Physician Signature: Nii He DO _______________________________ Date _____________            PAIN/SUBJECTIVE:   Initial:   0/10 pelvic pain Post Session:  0/10      HISTORY:   History of Present Injury/Illness (Reason for Referral):  Pt is a 80 yo male referred to pelvic floor physical therapy (PFPT) by Nii He DO due to UI post open simple SP prostatectomy on 10/2/19. Pt also reports difficulty with getting an erection. He has intermittent erections during sleep or middle of the night. History of abdominal hernia repair. History of right hip replacement. Also has spinal stenosis. Also has peripheral neuropathy. He has never tried pelvic floor exercises before. He can start/stop stream in standing. Urinary: Frequency 5-8 x/day, 0-1x/night. Positive for slow dribbling UI. Denies urinary urge incontinence. Has not had a urinary accident. Negative for incomplete emptying, urinary hesitancy/intermittency, dysuria and hematuria. Pt does use pads for protection; 1 pads per day (PPD). Fluid intake: 0 oz water/day; bladder irritants include: Citrus non-sugar tea (2-3 bottles per day)    Bowel: No difficulty    Sexual: Pt is not sexually active with female partners. Positive for history of erectile dysfunction. Past Medical History/Comorbidities:   Mr. Jarrod Jenkins  has a past medical history of Arthritis, Axonal polyneuropathy (1/15/2018), BPH (benign prostatic hypertrophy) (12/4/2013), Colon polyps, Diplopia (1/28/2020), ED (erectile dysfunction), Elevated prostate specific antigen (PSA), Hiatal hernia, HTN (hypertension) (12/4/2013), Impotence of organic origin, Leukoplakia of lips, Memory difficulty (1/28/2020), Ocular migraine, Peripheral neuropathy, Sleep apnea, Spinal stenosis, Ventral hernia, and Vertigo (12/4/2013).   Mr. Jarrod Jenkins  has a past surgical history that includes hx hip replacement (Right, 2007); hx tonsillectomy (childhood); hx lap cholecystectomy (2007); hx hernia repair (Left, 1955); hx hernia repair (Right); and hx rotator cuff repair (Right, 2013). Social History/Living Environment:   Pt lives with wife  Alcohol use? none  Tobacco use? Non-smoker  Sexual abuse? none    Prior Level of Function/Work/Activity:  Prior level of function: WFL  Occupation: Retired  Exercise activities: Walking - limited 3-4 blocks. Recently starting riding a stationary bike. Personal Factors:          Sex:  male     Ambulatory/Rehab Services H2 Model Falls Risk Assessment    Risk Factors:       (5)  History of Recent Falls [w/in 3 months] Ability to Rise from Chair:       (1)  Pushes up, successful in one attempt    Falls Prevention Plan:       Physical Limitations to Exercise (specify):  peripheral neuropathy   Total: (5 or greater = High Risk): 3    ©2010 [facility] [employer]. All Rights Reserved. [facility] [employer] Patent #[id number]. Federal Law prohibits the replication, distribution or use without written permission from [employer]     Current Medications:  No current outpatient medications on file. Date Last Reviewed:  3/4/2022   Number of Personal Factors/Comorbidities that affect the Plan of Care: 1-2: MODERATE COMPLEXITY   EXAMINATION:   External Observation:   · Voluntary Contraction: present  · Voluntary Relaxation: delayed  · Involuntary Contraction: present  · Involuntary Relaxation: present  · Gait: Ambulates with trunk flexion.      SEMG evaluation: will complete at follow-up appointment    Pelvic Floor Muscle (PFM) Assessment:   Muscle volume: [] decreased     [x] WNL     [] Defect   PFM tension: [] decreased     [] increased     [x] WNL    Contraction ability (MMT performed via rectal canal):  Voluntary contraction: [] absent     [] weak     [x] moderate      [] strong  Voluntary relaxation: [] absent     [x] partial     [] complete   MMT: 4/5   PFM endurance: 10 seconds   Repetitions of endurance contractions: 4  Number of quick contractions in 10 seconds: 6  Quality of contractions: Fair to Good  Overflow: [] absent     [x] min     [] mod     [] severe / Compensatory mm groups include adductors, gluteals, mild breath holding    Hip strength:  will complete at follow-up appointment     External palpation:  Muscle/muscle group Tender? Adductors [] Right  [] Left  []N/T   Gluteals [] Right  [] Left  []N/T   Piriformis [] Right  [] Left  []N/T   Iliopsoas [] Right  [] Left  []N/T   Abdominal wall [] Right  [] Left  []N/T   Pubic symphysis [] Right  [] Left  []N/T     Breath assessment:  Observation: chest breathing dominant  Coordination of pelvic floor muscles with breath: moderate pelvic floor muscle excursion  Co-contraction of PFM with transversus abdominis: present       Body Structures Involved:  1. Nerves  2. Muscles Body Functions Affected:  1. Genitourinary  2. Reproductive  3. Neuromusculoskeletal Activities and Participation Affected:  1. Mobility  2. Self Care  3. Domestic Life  4.  Community, Social and [place]   Number of elements that affect the Plan of Care: 3: MODERATE COMPLEXITY   CLINICAL PRESENTATION:   Presentation: Evolving clinical presentation with changing clinical characteristics: MODERATE COMPLEXITY   CLINICAL DECISION MAKING:   Use of outcome tool(s) and clinical judgement create a POC that gives a: Questionable prediction of patient's progress: Odalys. 15, PT, DPT

## (undated) DEVICE — GOWN,REINF,POLY,ECL,PP SLV,XL: Brand: MEDLINE

## (undated) DEVICE — SMALL BOWL SET-LF: Brand: MEDLINE INDUSTRIES, INC.

## (undated) DEVICE — Device

## (undated) DEVICE — SPONGE: SPECIALTY PEANUT XR 100/CS: Brand: MEDICAL ACTION INDUSTRIES

## (undated) DEVICE — SUTURE ETHLN SZ 2-0 L18IN NONABSORBABLE BLK L26MM PS 3/8 585H

## (undated) DEVICE — SUTURE PDS II SZ 1 L96IN ABSRB VLT TP-1 L65MM 1/2 CIR Z880G

## (undated) DEVICE — INTENDED TO BE USED TO OCCLUDE, RETRACT AND IDENTIFY ARTERIES, VEINS, TENDONS AND NERVES IN SURGICAL PROCEDURES: Brand: STERION®  VESSEL LOOP

## (undated) DEVICE — DEVICE STBL AD TRICOT ANCHR PD FOR 3 W F CATH STATLOK

## (undated) DEVICE — NEEDLE HYPO 21GA L1.5IN INTRAMUSCULAR S STL LATCH BVL UP

## (undated) DEVICE — SOLUTION IV 1000ML 0.9% SOD CHL

## (undated) DEVICE — KENDALL SCD EXPRESS SLEEVES, KNEE LENGTH, MEDIUM: Brand: KENDALL SCD

## (undated) DEVICE — DRAPE TWL SURG 16X26IN BLU ORB04] ALLCARE INC]

## (undated) DEVICE — BASIC SINGLE BASIN 1-LF: Brand: MEDLINE INDUSTRIES, INC.

## (undated) DEVICE — SUTURE VCRL SZ 3-0 L27IN ABSRB UD L26MM SH 1/2 CIR J416H

## (undated) DEVICE — BINDER ABD M/L H12IN FOR 46-62IN WHT 4 SLD PNL DSGN HOOP

## (undated) DEVICE — SUTURE CHROMIC GUT SZ 2-0 L27IN ABSRB BRN L26MM UR-6 5/8 N878H

## (undated) DEVICE — CATH URETH FOL 2W SH 22FRX5ML -- CONVERT TO ITEM 363075

## (undated) DEVICE — STANDARD HYPODERMIC NEEDLE,POLYPROPYLENE HUB: Brand: MONOJECT

## (undated) DEVICE — BLADE ELECTRODE: Brand: EDGE

## (undated) DEVICE — GARMENT,MEDLINE,DVT,INT,CALF,MED, GEN2: Brand: MEDLINE

## (undated) DEVICE — SUTURE VCRL SZ 2-0 L27IN ABSRB UD L26MM SH 1/2 CIR J417H

## (undated) DEVICE — SHEET, T, LAPAROTOMY, STERILE: Brand: MEDLINE

## (undated) DEVICE — SUTURE PDS II SZ 0 L27IN ABSRB VLT L36MM CT-1 1/2 CIR Z340H

## (undated) DEVICE — SYRINGE CATH TIP 50ML

## (undated) DEVICE — SUTURE VCRL SZ 4-0 L27IN ABSRB UD L19MM PS-2 3/8 CIR PRIM J426H

## (undated) DEVICE — PREP CHLORAPREP 10.5 ML ORG --

## (undated) DEVICE — 3M™ TEGADERM™ TRANSPARENT FILM DRESSING FRAME STYLE, 1627, 4 IN X 10 IN (10 CM X 25 CM), 20/CT 4CT/CASE: Brand: 3M™ TEGADERM™

## (undated) DEVICE — REM POLYHESIVE ADULT PATIENT RETURN ELECTRODE: Brand: VALLEYLAB

## (undated) DEVICE — SLIM BODY SKIN STAPLER: Brand: APPOSE ULC

## (undated) DEVICE — DRAIN WND RND SILICONE 15FR --

## (undated) DEVICE — RESERVOIR,SUCTION,100CC,SILICONE: Brand: MEDLINE

## (undated) DEVICE — CATHETER URETH 22FR BLLN 30CC SIL HYDRGEL 3 W F SPEC SHT

## (undated) DEVICE — SURGICAL PROCEDURE PACK BASIC ST FRANCIS

## (undated) DEVICE — CATHETER URETH 22FR 30CC BLLN F 3 W SPEC M RND TIP TWO

## (undated) DEVICE — PAD,ABDOMINAL,5"X9",ST,LF,25/BX: Brand: MEDLINE INDUSTRIES, INC.

## (undated) DEVICE — INTENDED FOR TISSUE SEPARATION, AND OTHER PROCEDURES THAT REQUIRE A SHARP SURGICAL BLADE TO PUNCTURE OR CUT.: Brand: BARD-PARKER SAFETY BLADES SIZE 10, STERILE

## (undated) DEVICE — GAUZE,SPONGE,8"X4",12PLY,XRAY,STRL,LF: Brand: MEDLINE

## (undated) DEVICE — BAG DRNGE 4000ML CONT IRRIG ROUNDED TEARDROP SHP DISP

## (undated) DEVICE — GOWN,REINFORCED,POLY,AURORA,XXLARGE,STR: Brand: MEDLINE

## (undated) DEVICE — SUTURE VCRL SZ 0 L36IN ABSRB UD L48MM CTX 1/2 CIR J978H

## (undated) DEVICE — SPONGE LAP 18X18IN STRL -- 5/PK

## (undated) DEVICE — SYR LR LCK 1ML GRAD NSAF 30ML --

## (undated) DEVICE — APPLIER CLP L L13IN TI MULT RNG HNDL 20 CLP STR LIGACLP

## (undated) DEVICE — SUTURE VCRL SZ 2-0 L27IN ABSRB VLT L26MM UR-6 5/8 CIR J602H

## (undated) DEVICE — JELLY LUBRICATING 10GM PREFIL SYR LUBE

## (undated) DEVICE — TRAY PREP DRY W/ PREM GLV 2 APPL 6 SPNG 2 UNDPD 1 OVERWRAP

## (undated) DEVICE — AGENT HEMSTAT W2XL14IN OXIDIZED REGENERATED CELOS ABSRB FOR

## (undated) DEVICE — 3M™ IOBAN™ 2 ANTIMICROBIAL INCISE DRAPE 6650EZ: Brand: IOBAN™ 2

## (undated) DEVICE — BLADE ASSEMB CLP HAIR FINE --

## (undated) DEVICE — PAD,NON-ADHERENT,3X8,STERILE,LF,1/PK: Brand: MEDLINE

## (undated) DEVICE — Y-TYPE TUR/BLADDER IRRIGATION SET, REGULATING CLAMP

## (undated) DEVICE — SHEAR RMFG HARMONIC 5MMX23CM -- LAWSON OEM ITEM 322218

## (undated) DEVICE — BUTTON SWITCH PENCIL BLADE ELECTRODE, HOLSTER: Brand: EDGE

## (undated) DEVICE — INTENDED FOR TISSUE SEPARATION, AND OTHER PROCEDURES THAT REQUIRE A SHARP SURGICAL BLADE TO PUNCTURE OR CUT.: Brand: BARD-PARKER SAFETY BLADES SIZE 15, STERILE

## (undated) DEVICE — CATHETER URETH 20FR BLLN 5CC STD LTX HYDRGEL 2 W F BARDX

## (undated) DEVICE — DRAPE,INSTRUMENT,MAGNETIC,10X16: Brand: MEDLINE

## (undated) DEVICE — DERMABOND SKIN ADH 0.7ML -- DERMABOND ADVANCED 12/BX

## (undated) DEVICE — APPLIER LIG CLP M L11IN TI STR RNG HNDL FOR 20 CLP DISP

## (undated) DEVICE — MINOR SPLIT GENERAL: Brand: MEDLINE INDUSTRIES, INC.

## (undated) DEVICE — STERILE LATEX POWDER FREE SURGICAL GLOVES WITH HYDROGEL COATING: Brand: PROTEXIS

## (undated) DEVICE — CATHETER F BLLN 5CC 16FR 2 W HYDRGEL COAT LESS TRAUM LUB